# Patient Record
Sex: FEMALE | Race: WHITE | NOT HISPANIC OR LATINO | Employment: OTHER | URBAN - METROPOLITAN AREA
[De-identification: names, ages, dates, MRNs, and addresses within clinical notes are randomized per-mention and may not be internally consistent; named-entity substitution may affect disease eponyms.]

---

## 2017-07-24 ENCOUNTER — HISTORICAL (OUTPATIENT)
Dept: RADIOLOGY | Facility: HOSPITAL | Age: 57
End: 2017-07-24

## 2018-04-05 ENCOUNTER — HISTORICAL (OUTPATIENT)
Dept: RADIOLOGY | Facility: HOSPITAL | Age: 58
End: 2018-04-05

## 2021-10-19 ENCOUNTER — HISTORICAL (OUTPATIENT)
Dept: RESPIRATORY THERAPY | Facility: HOSPITAL | Age: 61
End: 2021-10-19

## 2022-06-21 ENCOUNTER — OFFICE VISIT (OUTPATIENT)
Dept: URGENT CARE | Facility: CLINIC | Age: 62
End: 2022-06-21
Payer: MEDICARE

## 2022-06-21 VITALS
BODY MASS INDEX: 55.32 KG/M2 | HEIGHT: 61 IN | WEIGHT: 293 LBS | RESPIRATION RATE: 18 BRPM | TEMPERATURE: 98 F | SYSTOLIC BLOOD PRESSURE: 119 MMHG | DIASTOLIC BLOOD PRESSURE: 66 MMHG | HEART RATE: 78 BPM | OXYGEN SATURATION: 97 %

## 2022-06-21 DIAGNOSIS — R30.0 DYSURIA: Primary | ICD-10-CM

## 2022-06-21 LAB
BILIRUB UR QL STRIP: NEGATIVE
GLUCOSE UR QL STRIP: NEGATIVE
KETONES UR QL STRIP: NEGATIVE
LEUKOCYTE ESTERASE UR QL STRIP: POSITIVE
PH, POC UA: 7
POC BLOOD, URINE: POSITIVE
POC NITRATES, URINE: NEGATIVE
PROT UR QL STRIP: NEGATIVE
SP GR UR STRIP: 1.01 (ref 1–1.03)
UROBILINOGEN UR STRIP-ACNC: 0.2 (ref 0.1–1.1)

## 2022-06-21 PROCEDURE — 99215 OFFICE O/P EST HI 40 MIN: CPT | Mod: PBBFAC | Performed by: NURSE PRACTITIONER

## 2022-06-21 PROCEDURE — 87088 URINE BACTERIA CULTURE: CPT | Performed by: NURSE PRACTITIONER

## 2022-06-21 PROCEDURE — 81003 URINALYSIS AUTO W/O SCOPE: CPT | Mod: PBBFAC | Performed by: NURSE PRACTITIONER

## 2022-06-21 PROCEDURE — 99213 PR OFFICE/OUTPT VISIT, EST, LEVL III, 20-29 MIN: ICD-10-PCS | Mod: S$PBB,,, | Performed by: NURSE PRACTITIONER

## 2022-06-21 PROCEDURE — 99213 OFFICE O/P EST LOW 20 MIN: CPT | Mod: S$PBB,,, | Performed by: NURSE PRACTITIONER

## 2022-06-21 RX ORDER — SIMVASTATIN 40 MG/1
TABLET, FILM COATED ORAL
COMMUNITY
End: 2024-02-01

## 2022-06-21 RX ORDER — METFORMIN HYDROCHLORIDE 500 MG/1
TABLET ORAL
COMMUNITY
Start: 2022-05-31

## 2022-06-21 RX ORDER — METOPROLOL SUCCINATE 25 MG/1
25 TABLET, EXTENDED RELEASE ORAL DAILY
COMMUNITY
Start: 2022-05-05 | End: 2024-02-01

## 2022-06-21 RX ORDER — PAROXETINE 10 MG/1
TABLET, FILM COATED ORAL
COMMUNITY
End: 2022-10-19 | Stop reason: ALTCHOICE

## 2022-06-21 RX ORDER — LOSARTAN POTASSIUM 100 MG/1
100 TABLET ORAL DAILY
COMMUNITY
Start: 2022-05-05 | End: 2024-02-01

## 2022-06-21 RX ORDER — LEVOTHYROXINE SODIUM 50 UG/1
TABLET ORAL
COMMUNITY
End: 2023-07-06

## 2022-06-21 RX ORDER — GABAPENTIN 600 MG/1
TABLET ORAL
COMMUNITY

## 2022-06-21 RX ORDER — ESOMEPRAZOLE MAGNESIUM 40 MG/1
GRANULE, DELAYED RELEASE ORAL
COMMUNITY
End: 2023-07-06

## 2022-06-21 RX ORDER — NITROFURANTOIN 25; 75 MG/1; MG/1
100 CAPSULE ORAL 2 TIMES DAILY
Qty: 14 CAPSULE | Refills: 0 | Status: SHIPPED | OUTPATIENT
Start: 2022-06-21 | End: 2022-06-28

## 2022-06-21 RX ORDER — PHENAZOPYRIDINE HYDROCHLORIDE 100 MG/1
100 TABLET, FILM COATED ORAL
Qty: 9 TABLET | Refills: 0 | Status: SHIPPED | OUTPATIENT
Start: 2022-06-21 | End: 2022-06-24

## 2022-06-21 RX ORDER — PAROXETINE HYDROCHLORIDE 20 MG/1
TABLET, FILM COATED ORAL
COMMUNITY
End: 2022-10-19 | Stop reason: ALTCHOICE

## 2022-06-21 NOTE — PROGRESS NOTES
"Subjective:       Patient ID: Bharati Townsend is a 61 y.o. female.    Vitals:  height is 5' 0.63" (1.54 m) and weight is 138.6 kg (305 lb 9.6 oz) (abnormal). Her oral temperature is 98 °F (36.7 °C). Her blood pressure is 119/66 and her pulse is 78. Her respiration is 18 and oxygen saturation is 97%.     Chief Complaint: Urinary Tract Infection (3 days, dysuria) and Referral    Burning at the end of urination x 3 days. No fevers, n, v, d, flank pain.    ROS    Objective:      Physical Exam   Constitutional: She is oriented to person, place, and time.  Non-toxic appearance. She does not appear ill. No distress. obesity  Pulmonary/Chest: Effort normal.   Abdominal: Normal appearance.   Musculoskeletal:      Comments: Ambulates with walker.   Neurological: She is alert and oriented to person, place, and time. Gait abnormal.   Psychiatric: Her behavior is normal. Mood and thought content normal.         Assessment:       1. Dysuria        Results for orders placed or performed in visit on 06/21/22   POCT Urinalysis, Dipstick, Automated, W/O Scope   Result Value Ref Range    POC Blood, Urine Positive (A) Negative    POC Bilirubin, Urine Negative Negative    POC Urobilinogen, Urine 0.2 0.1 - 1.1    POC Ketones, Urine Negative Negative    POC Protein, Urine Negative Negative    POC Nitrates, Urine Negative Negative    POC Glucose, Urine Negative Negative    pH, UA 7.0     POC Specific Gravity, Urine 1.015 1.003 - 1.029    POC Leukocytes, Urine Positive (A) Negative       Plan:         Dysuria  -     POCT Urinalysis, Dipstick, Automated, W/O Scope  -     Urine culture    Other orders  -     nitrofurantoin, macrocrystal-monohydrate, (MACROBID) 100 MG capsule; Take 1 capsule (100 mg total) by mouth 2 (two) times daily. for 7 days  Dispense: 14 capsule; Refill: 0  -     phenazopyridine (PYRIDIUM) 100 MG tablet; Take 1 tablet (100 mg total) by mouth 3 (three) times daily with meals. Will cause orange to red urine staining. for 3 " days  Dispense: 9 tablet; Refill: 0         Keep forcing hydration with water.  No cranberry juice.  Cranberry concentrate if desired.  Start antibiotics today.  Will notify if needing antibiotic change in 3 days when culture has resulted.

## 2022-06-21 NOTE — PATIENT INSTRUCTIONS
Keep forcing hydration with water.  No cranberry juice.  Cranberry concentrate if desired.  Start antibiotics today.  Will notify if needing antibiotic change in 3 days when culture has resulted.

## 2022-06-22 ENCOUNTER — HOSPITAL ENCOUNTER (EMERGENCY)
Facility: HOSPITAL | Age: 62
Discharge: HOME | End: 2022-06-23
Attending: FAMILY MEDICINE
Payer: MEDICARE

## 2022-06-22 DIAGNOSIS — N20.0 KIDNEY STONE ON RIGHT SIDE: Primary | ICD-10-CM

## 2022-06-22 LAB
ABS NEUT CALC (OHS): 6.73 X10(3)/MCL (ref 2.1–9.2)
ALBUMIN SERPL-MCNC: 3.4 GM/DL (ref 3.4–4.8)
ALBUMIN/GLOB SERPL: 0.9 RATIO (ref 1.1–2)
ALP SERPL-CCNC: 77 UNIT/L (ref 40–150)
ALT SERPL-CCNC: 18 UNIT/L (ref 0–55)
APPEARANCE UR: ABNORMAL
AST SERPL-CCNC: 24 UNIT/L (ref 5–34)
BACTERIA #/AREA URNS AUTO: ABNORMAL /HPF
BILIRUB UR QL STRIP.AUTO: ABNORMAL MG/DL
BILIRUBIN DIRECT+TOT PNL SERPL-MCNC: 0.6 MG/DL
BUN SERPL-MCNC: 12 MG/DL (ref 9.8–20.1)
CALCIUM SERPL-MCNC: 9.7 MG/DL (ref 8.4–10.2)
CHLORIDE SERPL-SCNC: 103 MMOL/L (ref 98–107)
CO2 SERPL-SCNC: 30 MMOL/L (ref 23–31)
COLOR UR AUTO: ABNORMAL
CREAT SERPL-MCNC: 0.78 MG/DL (ref 0.55–1.02)
EOSINOPHIL NFR BLD MANUAL: 0.12 X10(3)/MCL (ref 0–0.9)
EOSINOPHIL NFR BLD MANUAL: 1 % (ref 0–8)
ERYTHROCYTE [DISTWIDTH] IN BLOOD BY AUTOMATED COUNT: 14 % (ref 11.5–17)
GLOBULIN SER-MCNC: 3.8 GM/DL (ref 2.4–3.5)
GLUCOSE SERPL-MCNC: 146 MG/DL (ref 82–115)
GLUCOSE UR QL STRIP.AUTO: NORMAL MG/DL
HCT VFR BLD AUTO: 43.7 % (ref 37–47)
HGB BLD-MCNC: 13.4 GM/DL (ref 12–16)
HYALINE CASTS #/AREA URNS LPF: ABNORMAL /LPF
IMM GRANULOCYTES # BLD AUTO: 0.03 X10(3)/MCL (ref 0–0.02)
IMM GRANULOCYTES NFR BLD AUTO: 0.3 % (ref 0–0.43)
KETONES UR QL STRIP.AUTO: NEGATIVE MG/DL
LEUKOCYTE ESTERASE UR QL STRIP.AUTO: 75 UNIT/L
LYMPH ABN # BLD MANUAL: 1 %
LYMPHOCYTES NFR BLD MANUAL: 38 % (ref 13–40)
LYMPHOCYTES NFR BLD MANUAL: 4.48 X10(3)/MCL
MCH RBC QN AUTO: 28.3 PG (ref 27–31)
MCHC RBC AUTO-ENTMCNC: 30.7 MG/DL (ref 33–36)
MCV RBC AUTO: 92.2 FL (ref 80–94)
MONOCYTES NFR BLD MANUAL: 0.35 X10(3)/MCL (ref 0.1–1.3)
MONOCYTES NFR BLD MANUAL: 3 % (ref 2–11)
MUCOUS THREADS URNS QL MICRO: ABNORMAL /LPF
NEUTROPHILS NFR BLD MANUAL: 57 % (ref 47–80)
NITRITE UR QL STRIP.AUTO: ABNORMAL
NRBC BLD AUTO-RTO: 0 %
PH UR STRIP.AUTO: 5 [PH]
PLATELET # BLD AUTO: 363 X10(3)/MCL (ref 130–400)
PLATELET # BLD EST: ADEQUATE 10*3/UL
PMV BLD AUTO: 9.8 FL (ref 9.4–12.4)
POTASSIUM SERPL-SCNC: 5 MMOL/L (ref 3.5–5.1)
PROT SERPL-MCNC: 7.2 GM/DL (ref 5.8–7.6)
PROT UR QL STRIP.AUTO: ABNORMAL MG/DL
RBC # BLD AUTO: 4.74 X10(6)/MCL (ref 4.2–5.4)
RBC #/AREA URNS AUTO: ABNORMAL /HPF
RBC MORPH BLD: NORMAL
RBC UR QL AUTO: NEGATIVE UNIT/L
SODIUM SERPL-SCNC: 142 MMOL/L (ref 136–145)
SP GR UR STRIP.AUTO: 1.02
SQUAMOUS #/AREA URNS LPF: ABNORMAL /HPF
UROBILINOGEN UR STRIP-ACNC: ABNORMAL MG/DL
WBC # SPEC AUTO: 11.8 X10(3)/MCL (ref 4.5–11.5)
WBC #/AREA URNS AUTO: ABNORMAL /HPF

## 2022-06-22 PROCEDURE — 85025 COMPLETE CBC W/AUTO DIFF WBC: CPT | Performed by: NURSE PRACTITIONER

## 2022-06-22 PROCEDURE — 25000003 PHARM REV CODE 250: Performed by: NURSE PRACTITIONER

## 2022-06-22 PROCEDURE — 63600175 PHARM REV CODE 636 W HCPCS: Performed by: NURSE PRACTITIONER

## 2022-06-22 PROCEDURE — 96375 TX/PRO/DX INJ NEW DRUG ADDON: CPT

## 2022-06-22 PROCEDURE — 99285 EMERGENCY DEPT VISIT HI MDM: CPT | Mod: 25

## 2022-06-22 PROCEDURE — 80053 COMPREHEN METABOLIC PANEL: CPT | Performed by: NURSE PRACTITIONER

## 2022-06-22 PROCEDURE — 96374 THER/PROPH/DIAG INJ IV PUSH: CPT

## 2022-06-22 PROCEDURE — 96361 HYDRATE IV INFUSION ADD-ON: CPT

## 2022-06-22 PROCEDURE — 36415 COLL VENOUS BLD VENIPUNCTURE: CPT | Performed by: NURSE PRACTITIONER

## 2022-06-22 PROCEDURE — 81001 URINALYSIS AUTO W/SCOPE: CPT | Performed by: NURSE PRACTITIONER

## 2022-06-22 RX ORDER — ONDANSETRON 4 MG/1
4 TABLET, ORALLY DISINTEGRATING ORAL
Status: COMPLETED | OUTPATIENT
Start: 2022-06-22 | End: 2022-06-22

## 2022-06-22 RX ORDER — ONDANSETRON 4 MG/1
4 TABLET, ORALLY DISINTEGRATING ORAL EVERY 8 HOURS PRN
Qty: 9 TABLET | Refills: 0 | Status: SHIPPED | OUTPATIENT
Start: 2022-06-22 | End: 2022-06-25

## 2022-06-22 RX ORDER — ONDANSETRON 2 MG/ML
INJECTION INTRAMUSCULAR; INTRAVENOUS
Status: COMPLETED
Start: 2022-06-22 | End: 2022-06-22

## 2022-06-22 RX ORDER — ONDANSETRON 2 MG/ML
4 INJECTION INTRAMUSCULAR; INTRAVENOUS ONCE
Status: COMPLETED | OUTPATIENT
Start: 2022-06-23 | End: 2022-06-22

## 2022-06-22 RX ORDER — KETOROLAC TROMETHAMINE 10 MG/1
10 TABLET, FILM COATED ORAL EVERY 6 HOURS PRN
Qty: 20 TABLET | Refills: 0 | Status: SHIPPED | OUTPATIENT
Start: 2022-06-22 | End: 2022-06-27

## 2022-06-22 RX ORDER — MORPHINE SULFATE 2 MG/ML
2 INJECTION, SOLUTION INTRAMUSCULAR; INTRAVENOUS
Status: COMPLETED | OUTPATIENT
Start: 2022-06-22 | End: 2022-06-22

## 2022-06-22 RX ORDER — HYDROCODONE BITARTRATE AND ACETAMINOPHEN 5; 325 MG/1; MG/1
1 TABLET ORAL
Status: COMPLETED | OUTPATIENT
Start: 2022-06-22 | End: 2022-06-22

## 2022-06-22 RX ORDER — KETOROLAC TROMETHAMINE 30 MG/ML
15 INJECTION, SOLUTION INTRAMUSCULAR; INTRAVENOUS
Status: COMPLETED | OUTPATIENT
Start: 2022-06-22 | End: 2022-06-22

## 2022-06-22 RX ORDER — TAMSULOSIN HYDROCHLORIDE 0.4 MG/1
0.4 CAPSULE ORAL DAILY
Qty: 14 CAPSULE | Refills: 0 | Status: SHIPPED | OUTPATIENT
Start: 2022-06-22 | End: 2022-10-19 | Stop reason: ALTCHOICE

## 2022-06-22 RX ORDER — HYDROCODONE BITARTRATE AND ACETAMINOPHEN 5; 325 MG/1; MG/1
1 TABLET ORAL EVERY 8 HOURS PRN
Qty: 15 TABLET | Refills: 0 | Status: SHIPPED | OUTPATIENT
Start: 2022-06-22 | End: 2022-06-27

## 2022-06-22 RX ADMIN — KETOROLAC TROMETHAMINE 15 MG: 30 INJECTION, SOLUTION INTRAMUSCULAR at 11:06

## 2022-06-22 RX ADMIN — ONDANSETRON 4 MG: 4 TABLET, ORALLY DISINTEGRATING ORAL at 09:06

## 2022-06-22 RX ADMIN — SODIUM CHLORIDE 1000 ML: 9 INJECTION, SOLUTION INTRAVENOUS at 11:06

## 2022-06-22 RX ADMIN — ONDANSETRON 4 MG: 2 INJECTION INTRAMUSCULAR; INTRAVENOUS at 11:06

## 2022-06-22 RX ADMIN — MORPHINE SULFATE 2 MG: 2 INJECTION, SOLUTION INTRAMUSCULAR; INTRAVENOUS at 11:06

## 2022-06-22 RX ADMIN — HYDROCODONE BITARTRATE AND ACETAMINOPHEN 1 TABLET: 5; 325 TABLET ORAL at 09:06

## 2022-06-23 VITALS
WEIGHT: 293 LBS | TEMPERATURE: 98 F | SYSTOLIC BLOOD PRESSURE: 93 MMHG | RESPIRATION RATE: 20 BRPM | OXYGEN SATURATION: 98 % | BODY MASS INDEX: 57.52 KG/M2 | HEIGHT: 60 IN | DIASTOLIC BLOOD PRESSURE: 62 MMHG | HEART RATE: 89 BPM

## 2022-06-23 LAB — BACTERIA UR CULT: ABNORMAL

## 2022-06-23 PROCEDURE — 63600175 PHARM REV CODE 636 W HCPCS: Performed by: NURSE PRACTITIONER

## 2022-06-23 NOTE — DISCHARGE INSTRUCTIONS
Follow up with your primary care physician in 3-5 days for follow up evaluation.  Take pain medication as prescribed for no more than 7 days.  Increase fluid intake, clear liquids x 12 hours. Advance diet slowly.

## 2022-06-23 NOTE — ED PROVIDER NOTES
Encounter Date: 6/22/2022       History     Chief Complaint   Patient presents with    Flank Pain    Back Pain     Reports right flank/back pain for the last 3 days.  Pt currently being treated for bladder infection     Pt is a 61 y.o. female who presents with Rt lower back pain that radiates into her Rt groin. Symptoms present x 3 days. Denies chest pain, SOB, weakness, dizziness, fever, abdominal pain, or loss of bowel or bladder control. Reports being diagnosed with a bladder infection yesterday at a local clinic and began her medication today.         Review of patient's allergies indicates:   Allergen Reactions    Sulfamethoxazole-trimethoprim Rash     Past Medical History:   Diagnosis Date    Breast cancer     Diabetes mellitus     GERD (gastroesophageal reflux disease)     Hypertension      History reviewed. No pertinent surgical history.  History reviewed. No pertinent family history.  Social History     Tobacco Use    Smoking status: Former Smoker    Smokeless tobacco: Never Used   Substance Use Topics    Alcohol use: Yes     Alcohol/week: 2.0 standard drinks     Types: 2 Standard drinks or equivalent per week    Drug use: Never     Review of Systems   Constitutional: Negative for chills, diaphoresis, fatigue and fever.   HENT: Negative for facial swelling, rhinorrhea, sinus pressure, sinus pain, sore throat and trouble swallowing.    Respiratory: Negative for cough, chest tightness, shortness of breath and wheezing.    Cardiovascular: Negative for chest pain, palpitations and leg swelling.   Gastrointestinal: Negative for abdominal pain, diarrhea, nausea and vomiting.   Genitourinary: Positive for dysuria. Negative for flank pain, frequency, hematuria and urgency.   Musculoskeletal: Positive for back pain. Negative for arthralgias, joint swelling and myalgias.   Skin: Negative for color change and rash.   Neurological: Negative for dizziness, syncope, weakness and light-headedness.    Hematological: Does not bruise/bleed easily.   All other systems reviewed and are negative.      Physical Exam     Initial Vitals [06/22/22 2055]   BP Pulse Resp Temp SpO2   117/77 90 18 98.3 °F (36.8 °C) 98 %      MAP       --         Physical Exam    Nursing note and vitals reviewed.  Constitutional: She appears well-developed and well-nourished.   HENT:   Head: Normocephalic and atraumatic.   Nose: Nose normal.   Mouth/Throat: Oropharynx is clear and moist.   Eyes: Conjunctivae and EOM are normal. Pupils are equal, round, and reactive to light.   Neck: Neck supple.   Normal range of motion.  Cardiovascular: Normal rate, regular rhythm, normal heart sounds and intact distal pulses.   Pulmonary/Chest: Effort normal and breath sounds normal. No respiratory distress. She has no wheezes. She has no rhonchi. She has no rales. She exhibits no tenderness.   Abdominal: Abdomen is soft and flat. Bowel sounds are normal. She exhibits no distension. There is no abdominal tenderness.   There is right CVA tenderness.There is no rebound, no guarding, no tenderness at McBurney's point and negative Rae's sign. negative psoas sign  Musculoskeletal:         General: Normal range of motion.      Cervical back: Normal range of motion and neck supple.      Lumbar back: Spasms and tenderness present. No swelling or deformity. Normal range of motion.        Back:      Neurological: She is alert and oriented to person, place, and time. She has normal strength and normal reflexes.   Skin: Skin is warm and dry. Capillary refill takes less than 2 seconds.   Psychiatric: She has a normal mood and affect. Her speech is normal and behavior is normal. Judgment and thought content normal.         ED Course   Procedures  Labs Reviewed   COMPREHENSIVE METABOLIC PANEL - Abnormal; Notable for the following components:       Result Value    Glucose Level 146 (*)     Globulin 3.8 (*)     Albumin/Globulin Ratio 0.9 (*)     All other components  within normal limits   CBC WITH DIFFERENTIAL - Abnormal; Notable for the following components:    WBC 11.8 (*)     MCHC 30.7 (*)     IG# 0.03 (*)     All other components within normal limits   CBC W/ AUTO DIFFERENTIAL    Narrative:     The following orders were created for panel order CBC auto differential.  Procedure                               Abnormality         Status                     ---------                               -----------         ------                     CBC with Differential[[id number]]        Abnormal            Final result               Manual Differential[[id number]]                              Final result                 Please view results for these tests on the individual orders.   MANUAL DIFFERENTIAL   URINALYSIS, REFLEX TO URINE CULTURE          Imaging Results          CT Abdomen Pelvis  Without Contrast (Preliminary result)  Result time 06/22/22 22:23:09    Preliminary result by Rickey Danw MD (06/22/22 22:23:09)                 Narrative:    START OF REPORT:  Technique: CT of the abdomen and pelvis was performed with axial images as well as sagittal and coronal reconstruction images without intravenous contrast.    Comparison: None available.    Clinical History: Flank pain, kidney stone suspected.    Dosage Information: Automated Exposure Control was utilized.    Findings:  Lines and Tubes: None.  Thorax:  Lungs: The visualized lung bases appear unremarkable.  Pleura: No effusions or thickening.  Heart: The heart size is within normal limits.  Abdomen:  Abdominal Wall: No abdominal wall pathology is seen.  Liver: Mild fatty infiltration of the liver is present.  Biliary System: No extrahepatic biliary duct dilatation is seen.  Gallbladder: The gallbladder appears unremarkable.  Pancreas: The pancreas appears unremarkable.  Spleen: The spleen appears unremarkable.  Adrenals: The adrenal glands appear unremarkable.  Kidneys: There are two 3-4 mm stones at the right  ureterovesical junction (series 2, image 81) with associated mild right hydroureteronephrosis. There are also a few tiny nonobstructing stones in the right kidney. A punctate (1-2 mm) nonobstructing stone is seen at the lower pole calyx of the left kidney (series 6, image 72). The left kidney is otherwise unremarkable.  Aorta: The abdominal aorta appears unremarkable.  IVC: Unremarkable.  Bowel:  Esophagus: The visualized esophagus appears unremarkable.  Stomach: The stomach appears unremarkable.  Duodenum: Unremarkable appearing duodenum.  Small Bowel: The small bowel appears unremarkable.  Colon: Nondistended.  Appendix: The appendix appears unremarkable.  Peritoneum: No intraperitoneal free air or ascites is seen.    Pelvis:  Bladder: The bladder is nondistended and cannot be definitively evaluated.  Female:  Uterus: The uterus is surgically absent.  Ovaries: No adnexal masses are seen.    Bony structures:  Dorsal Spine: There is mild spondylosis of the visualized dorsal spine.      Impression:  1. There are two 3-4 mm stones at the right ureterovesical junction (series 2, image 81) with associated mild right hydroureteronephrosis. Correlate with clinical and laboratory findings as regards additional evaluation and follow-up.                                   Medications   sodium chloride 0.9% bolus 1,000 mL (has no administration in time range)   ketorolac injection 15 mg (has no administration in time range)   ondansetron injection 4 mg (has no administration in time range)   morphine injection 2 mg (has no administration in time range)   HYDROcodone-acetaminophen 5-325 mg per tablet 1 tablet (1 tablet Oral Given 6/22/22 2159)   ondansetron disintegrating tablet 4 mg (4 mg Oral Given 6/22/22 2159)     Medical Decision Making:   Differential Diagnosis:   UTI  Muscle strain  Renal stone  Clinical Tests:   Lab Tests: Ordered and Reviewed  Radiological Study: Ordered and Reviewed  ED Management:  11:26 PM  Reassessed patient at this time. Reports condition has improved. Discussed with patient all pertinent ED information and results. Discussed diagnosis and treatment plan with patient. Follow up instructions and return to ED instruction have been given. All questions and concerns were addressed at this time. Patient voices understanding of information and instructions. Patient is comfortable with plan and discharge. Patient is stable for discharge.                         Clinical Impression:   Final diagnoses:  [N20.0] Kidney stone on right side (Primary)          ED Disposition Condition    Discharge Stable        ED Prescriptions     Medication Sig Dispense Start Date End Date Auth. Provider    HYDROcodone-acetaminophen (NORCO) 5-325 mg per tablet Take 1 tablet by mouth every 8 (eight) hours as needed for Pain. 15 tablet 6/22/2022 6/27/2022 Venkatesh Blake Jr., ELENITA    ketorolac (TORADOL) 10 mg tablet Take 1 tablet (10 mg total) by mouth every 6 (six) hours as needed for Pain. 20 tablet 6/22/2022 6/27/2022 Venkatesh Blake Jr., CHETNAP    tamsulosin (FLOMAX) 0.4 mg Cap Take 1 capsule (0.4 mg total) by mouth once daily. for 14 days 14 capsule 6/22/2022 7/6/2022 Venkatesh Blake Jr., CHETNAP    ondansetron (ZOFRAN-ODT) 4 MG TbDL Take 1 tablet (4 mg total) by mouth every 8 (eight) hours as needed (nausea). 9 tablet 6/22/2022 6/25/2022 Venkatesh Blake Jr., CHETNAP        Follow-up Information     Follow up With Specialties Details Why Contact Info    Cony Bourgeois MD Internal Medicine In 1 week  [address] [facility] [id number] [phone]      [facility] Emergency Medicine In 3 days As needed, If symptoms worsen [address] [address] [phone]    [facility]  Schedule an appointment as soon as possible for a visit in 1 week Follow up with [facility] for evaluation. [address] [address]           Venkatesh Blake  , [facility]  06/22/22 1047

## 2022-07-08 ENCOUNTER — HOSPITAL ENCOUNTER (OUTPATIENT)
Dept: RADIOLOGY | Facility: HOSPITAL | Age: 62
Discharge: HOME | End: 2022-07-08
Attending: NURSE PRACTITIONER
Payer: MEDICAID

## 2022-07-08 ENCOUNTER — OFFICE VISIT (OUTPATIENT)
Dept: UROLOGY | Facility: CLINIC | Age: 62
End: 2022-07-08
Payer: MEDICARE

## 2022-07-08 ENCOUNTER — TELEPHONE (OUTPATIENT)
Dept: UROLOGY | Facility: CLINIC | Age: 62
End: 2022-07-08

## 2022-07-08 VITALS
SYSTOLIC BLOOD PRESSURE: 84 MMHG | HEART RATE: 73 BPM | RESPIRATION RATE: 20 BRPM | DIASTOLIC BLOOD PRESSURE: 49 MMHG | TEMPERATURE: 98 F | WEIGHT: 293 LBS | HEIGHT: 62 IN | OXYGEN SATURATION: 97 % | BODY MASS INDEX: 53.92 KG/M2

## 2022-07-08 DIAGNOSIS — N20.0 KIDNEY STONES: ICD-10-CM

## 2022-07-08 DIAGNOSIS — N20.0 KIDNEY STONE ON RIGHT SIDE: ICD-10-CM

## 2022-07-08 PROCEDURE — 3078F PR MOST RECENT DIASTOLIC BLOOD PRESSURE < 80 MM HG: ICD-10-PCS | Mod: CPTII,,, | Performed by: NURSE PRACTITIONER

## 2022-07-08 PROCEDURE — 1159F PR MEDICATION LIST DOCUMENTED IN MEDICAL RECORD: ICD-10-PCS | Mod: CPTII,,, | Performed by: NURSE PRACTITIONER

## 2022-07-08 PROCEDURE — 1160F RVW MEDS BY RX/DR IN RCRD: CPT | Mod: CPTII,,, | Performed by: NURSE PRACTITIONER

## 2022-07-08 PROCEDURE — 3074F PR MOST RECENT SYSTOLIC BLOOD PRESSURE < 130 MM HG: ICD-10-PCS | Mod: CPTII,,, | Performed by: NURSE PRACTITIONER

## 2022-07-08 PROCEDURE — 99214 OFFICE O/P EST MOD 30 MIN: CPT | Mod: S$PBB,,, | Performed by: NURSE PRACTITIONER

## 2022-07-08 PROCEDURE — 99214 PR OFFICE/OUTPT VISIT, EST, LEVL IV, 30-39 MIN: ICD-10-PCS | Mod: S$PBB,,, | Performed by: NURSE PRACTITIONER

## 2022-07-08 PROCEDURE — 1160F PR REVIEW ALL MEDS BY PRESCRIBER/CLIN PHARMACIST DOCUMENTED: ICD-10-PCS | Mod: CPTII,,, | Performed by: NURSE PRACTITIONER

## 2022-07-08 PROCEDURE — 74018 RADEX ABDOMEN 1 VIEW: CPT | Mod: TC

## 2022-07-08 PROCEDURE — 1159F MED LIST DOCD IN RCRD: CPT | Mod: CPTII,,, | Performed by: NURSE PRACTITIONER

## 2022-07-08 PROCEDURE — 3008F PR BODY MASS INDEX (BMI) DOCUMENTED: ICD-10-PCS | Mod: CPTII,,, | Performed by: NURSE PRACTITIONER

## 2022-07-08 PROCEDURE — 99215 OFFICE O/P EST HI 40 MIN: CPT | Mod: PBBFAC | Performed by: NURSE PRACTITIONER

## 2022-07-08 PROCEDURE — 4010F PR ACE/ARB THEARPY RXD/TAKEN: ICD-10-PCS | Mod: CPTII,,, | Performed by: NURSE PRACTITIONER

## 2022-07-08 PROCEDURE — 3078F DIAST BP <80 MM HG: CPT | Mod: CPTII,,, | Performed by: NURSE PRACTITIONER

## 2022-07-08 PROCEDURE — 3074F SYST BP LT 130 MM HG: CPT | Mod: CPTII,,, | Performed by: NURSE PRACTITIONER

## 2022-07-08 PROCEDURE — 3008F BODY MASS INDEX DOCD: CPT | Mod: CPTII,,, | Performed by: NURSE PRACTITIONER

## 2022-07-08 PROCEDURE — 4010F ACE/ARB THERAPY RXD/TAKEN: CPT | Mod: CPTII,,, | Performed by: NURSE PRACTITIONER

## 2022-07-08 NOTE — TELEPHONE ENCOUNTER
Patient notified of negative KUB no stones identified.  Instructed patient follow-up in 6 months a KUB repeat if negative will follow up p.r.n..  Also informed patient if symptoms recurred notify clinic to be revaluated treated.

## 2022-07-08 NOTE — PROGRESS NOTES
Placed in room. Seen by tulio Guerrero NP.  Spoke with patient. Will obtain KUB. F/U in 6 months.

## 2022-07-08 NOTE — PROGRESS NOTES
Chief Complaint:   Chief Complaint   Patient presents with    Nephrolithiasis     Right sided back pain       HPI:  Patient is a 61-year-old female referred to Urology for right kidney stones.  Patient seen in emergency room on 2022 with right flank pain radiating to her right groin for 3 days.  CT was performed and identified two 3-4 mm stones at the right ureterovesical junction (series 2, image 81) with associated mild right hydroureteronephrosis. There are also a few tiny nonobstructing stones in the right kidney. A punctate (1-2 mm) nonobstructing stone is seen at the lower pole calyx of the left kidney (series 6, image 72). The left kidney is otherwise unremarkable.  Today patient denies Flank pain, groin pain, lower abdominal pain, evidence of passing stone, strainer use, urinary frequency, urgency, incontinence, retention, dysuria, gross hematuria. Family history of stones, or urology pathology.     Plan: stone surveillance, KUB now, give patient a strainer to strain all urine until KUB confirmed, F/U 6 months with KUB. Instructed patient on Avoiding bladder irritants, such as alcohol, citrus drinks or foods,salty foods, energy drinks, spicy foods, caffeine, sodas.    Allergies:  Review of patient's allergies indicates:   Allergen Reactions    Sulfamethoxazole-trimethoprim Rash       Medications:  Current Outpatient Medications   Medication Sig Dispense Refill    esomeprazole (NEXIUM) 40 mg GrPS esomeprazole magnesium DR 40 mg granules delayed release for susp   Take 1 packet every day by oral route.      gabapentin (NEURONTIN) 600 MG tablet gabapentin 600 mg tablet      levothyroxine (SYNTHROID) 50 MCG tablet levothyroxine 50 mcg tablet      losartan (COZAAR) 100 MG tablet Take 100 mg by mouth once daily.      metFORMIN (GLUCOPHAGE) 500 MG tablet SMARTSI Tablet(s) By Mouth Morning-Evening      metoprolol succinate (TOPROL-XL) 25 MG 24 hr tablet Take 25 mg by mouth once daily.       paroxetine (PAXIL) 10 MG tablet paroxetine 10 mg tablet      paroxetine (PAXIL) 20 MG tablet paroxetine 20 mg tablet   1 po qd      simvastatin (ZOCOR) 40 MG tablet simvastatin 40 mg tablet   Take 1 tablet every day by oral route.      tamsulosin (FLOMAX) 0.4 mg Cap Take 1 capsule (0.4 mg total) by mouth once daily. for 14 days 14 capsule 0     No current facility-administered medications for this visit.       Review of Systems:  General: No fever, chills, fatigability, or weight loss.  Skin: No rashes, itching, or changes in color or texture of skin.  Chest: Denies HAYES, cyanosis, wheezing, cough, and sputum production.  Abdomen: Appetite fine. No weight loss. Denies diarrhea, abdominal pain, hematemesis, or blood in stool.  Musculoskeletal: No joint stiffness or swelling. Denies back pain.  : As above.  All other review of systems negative.    PMH:  Past Medical History:   Diagnosis Date    Breast cancer     Diabetes mellitus     GERD (gastroesophageal reflux disease)     Hypertension        PSH:  No past surgical history on file.    FamHx:  No family history on file.    SocHx:  Social History     Socioeconomic History    Marital status:    Tobacco Use    Smoking status: Former Smoker    Smokeless tobacco: Never Used   Substance and Sexual Activity    Alcohol use: Yes     Alcohol/week: 2.0 standard drinks     Types: 2 Standard drinks or equivalent per week    Drug use: Never       Physical Exam:  Vitals:    07/08/22 0748   BP: (!) 84/49   Pulse: 73   Resp: 20   Temp: 98.1 °F (36.7 °C)     General: A&Ox3, no apparent distress, no deformities  Neck: No masses, normal thyroid  Lungs: CTA corrine, no use of accessory muscles  Heart: RRR, no arrhythmias  Abdomen: Soft, NT, ND, no masses, no hernias, no hepatosplenomegaly  Lymphatic: Neck and groin nodes negative  Skin: The skin is warm and dry. No jaundice.  Ext: No c/c/e.      Labs:  None      Imaging:  Pending KUB      Impression:  Kidney  stones      Plan: stone surveillance, KUB, give patient a strainer to strain all urine until KUB confirmed, F/U 6 months. Instructed patient on Avoiding bladder irritants, such as alcohol, citrus drinks or foods,salty foods, energy drinks, spicy foods, caffeine, sodas.

## 2022-07-28 DIAGNOSIS — M79.642 LEFT HAND PAIN: Primary | ICD-10-CM

## 2022-09-08 ENCOUNTER — OFFICE VISIT (OUTPATIENT)
Dept: ORTHOPEDICS | Facility: CLINIC | Age: 62
End: 2022-09-08
Payer: MEDICARE

## 2022-09-08 VITALS
DIASTOLIC BLOOD PRESSURE: 78 MMHG | HEIGHT: 62 IN | WEIGHT: 293 LBS | HEART RATE: 78 BPM | BODY MASS INDEX: 53.92 KG/M2 | SYSTOLIC BLOOD PRESSURE: 119 MMHG | RESPIRATION RATE: 20 BRPM

## 2022-09-08 DIAGNOSIS — M79.642 LEFT HAND PAIN: ICD-10-CM

## 2022-09-08 DIAGNOSIS — M65.332 TRIGGER MIDDLE FINGER OF LEFT HAND: Primary | ICD-10-CM

## 2022-09-08 PROCEDURE — 3074F PR MOST RECENT SYSTOLIC BLOOD PRESSURE < 130 MM HG: ICD-10-PCS | Mod: CPTII,,, | Performed by: STUDENT IN AN ORGANIZED HEALTH CARE EDUCATION/TRAINING PROGRAM

## 2022-09-08 PROCEDURE — 99204 OFFICE O/P NEW MOD 45 MIN: CPT | Mod: 25,S$PBB,, | Performed by: STUDENT IN AN ORGANIZED HEALTH CARE EDUCATION/TRAINING PROGRAM

## 2022-09-08 PROCEDURE — 99214 OFFICE O/P EST MOD 30 MIN: CPT | Mod: PBBFAC,25 | Performed by: STUDENT IN AN ORGANIZED HEALTH CARE EDUCATION/TRAINING PROGRAM

## 2022-09-08 PROCEDURE — 1160F RVW MEDS BY RX/DR IN RCRD: CPT | Mod: CPTII,,, | Performed by: STUDENT IN AN ORGANIZED HEALTH CARE EDUCATION/TRAINING PROGRAM

## 2022-09-08 PROCEDURE — 20550 NJX 1 TENDON SHEATH/LIGAMENT: CPT | Mod: PBBFAC,LT | Performed by: STUDENT IN AN ORGANIZED HEALTH CARE EDUCATION/TRAINING PROGRAM

## 2022-09-08 PROCEDURE — 3078F DIAST BP <80 MM HG: CPT | Mod: CPTII,,, | Performed by: STUDENT IN AN ORGANIZED HEALTH CARE EDUCATION/TRAINING PROGRAM

## 2022-09-08 PROCEDURE — 3008F PR BODY MASS INDEX (BMI) DOCUMENTED: ICD-10-PCS | Mod: CPTII,,, | Performed by: STUDENT IN AN ORGANIZED HEALTH CARE EDUCATION/TRAINING PROGRAM

## 2022-09-08 PROCEDURE — 3074F SYST BP LT 130 MM HG: CPT | Mod: CPTII,,, | Performed by: STUDENT IN AN ORGANIZED HEALTH CARE EDUCATION/TRAINING PROGRAM

## 2022-09-08 PROCEDURE — 1159F PR MEDICATION LIST DOCUMENTED IN MEDICAL RECORD: ICD-10-PCS | Mod: CPTII,,, | Performed by: STUDENT IN AN ORGANIZED HEALTH CARE EDUCATION/TRAINING PROGRAM

## 2022-09-08 PROCEDURE — 99204 PR OFFICE/OUTPT VISIT, NEW, LEVL IV, 45-59 MIN: ICD-10-PCS | Mod: 25,S$PBB,, | Performed by: STUDENT IN AN ORGANIZED HEALTH CARE EDUCATION/TRAINING PROGRAM

## 2022-09-08 PROCEDURE — 3078F PR MOST RECENT DIASTOLIC BLOOD PRESSURE < 80 MM HG: ICD-10-PCS | Mod: CPTII,,, | Performed by: STUDENT IN AN ORGANIZED HEALTH CARE EDUCATION/TRAINING PROGRAM

## 2022-09-08 PROCEDURE — 4010F PR ACE/ARB THEARPY RXD/TAKEN: ICD-10-PCS | Mod: CPTII,,, | Performed by: STUDENT IN AN ORGANIZED HEALTH CARE EDUCATION/TRAINING PROGRAM

## 2022-09-08 PROCEDURE — 1159F MED LIST DOCD IN RCRD: CPT | Mod: CPTII,,, | Performed by: STUDENT IN AN ORGANIZED HEALTH CARE EDUCATION/TRAINING PROGRAM

## 2022-09-08 PROCEDURE — 1160F PR REVIEW ALL MEDS BY PRESCRIBER/CLIN PHARMACIST DOCUMENTED: ICD-10-PCS | Mod: CPTII,,, | Performed by: STUDENT IN AN ORGANIZED HEALTH CARE EDUCATION/TRAINING PROGRAM

## 2022-09-08 PROCEDURE — 20550 TENDON SHEATH: ICD-10-PCS | Mod: S$PBB,LT,, | Performed by: STUDENT IN AN ORGANIZED HEALTH CARE EDUCATION/TRAINING PROGRAM

## 2022-09-08 PROCEDURE — 4010F ACE/ARB THERAPY RXD/TAKEN: CPT | Mod: CPTII,,, | Performed by: STUDENT IN AN ORGANIZED HEALTH CARE EDUCATION/TRAINING PROGRAM

## 2022-09-08 PROCEDURE — 3008F BODY MASS INDEX DOCD: CPT | Mod: CPTII,,, | Performed by: STUDENT IN AN ORGANIZED HEALTH CARE EDUCATION/TRAINING PROGRAM

## 2022-09-08 RX ORDER — LIDOCAINE HYDROCHLORIDE 10 MG/ML
1 INJECTION INFILTRATION; PERINEURAL
Status: DISCONTINUED | OUTPATIENT
Start: 2022-09-08 | End: 2022-09-08 | Stop reason: HOSPADM

## 2022-09-08 RX ORDER — TRIAMCINOLONE ACETONIDE 40 MG/ML
40 INJECTION, SUSPENSION INTRA-ARTICULAR; INTRAMUSCULAR ONCE
Status: COMPLETED | OUTPATIENT
Start: 2022-09-08 | End: 2022-09-08

## 2022-09-08 RX ORDER — TRIAMCINOLONE ACETONIDE 40 MG/ML
40 INJECTION, SUSPENSION INTRA-ARTICULAR; INTRAMUSCULAR
Status: DISCONTINUED | OUTPATIENT
Start: 2022-09-08 | End: 2022-09-08 | Stop reason: HOSPADM

## 2022-09-08 RX ORDER — PAROXETINE 30 MG/1
60 TABLET, FILM COATED ORAL DAILY
COMMUNITY
Start: 2022-07-21 | End: 2024-02-01

## 2022-09-08 RX ORDER — LIDOCAINE HYDROCHLORIDE 10 MG/ML
1 INJECTION, SOLUTION EPIDURAL; INFILTRATION; INTRACAUDAL; PERINEURAL
Status: COMPLETED | OUTPATIENT
Start: 2022-09-08 | End: 2022-09-08

## 2022-09-08 RX ADMIN — TRIAMCINOLONE ACETONIDE 40 MG: 40 INJECTION, SUSPENSION INTRA-ARTICULAR; INTRAMUSCULAR at 09:09

## 2022-09-08 RX ADMIN — LIDOCAINE HYDROCHLORIDE 1 ML: 10 INJECTION INFILTRATION; PERINEURAL at 09:09

## 2022-09-08 RX ADMIN — LIDOCAINE HYDROCHLORIDE 10 MG: 10 INJECTION, SOLUTION EPIDURAL; INFILTRATION; INTRACAUDAL; PERINEURAL at 09:09

## 2022-09-08 NOTE — PROGRESS NOTES
Subjective:       New pt   Patient ID: Bharati Townsend is a Right dominate 61 y.o. female. Non-smoker. 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          Chief Complaint: Pain of the Left Hand    HPI:    Left hand/wrist pain. Pt states she stated with left  3rd finger trigger finger.  She has catching and locking in the 3rd finger with flexion and also has pain that extends across her metacarpal row.  She occasionally has burning in the fingertips.  Of note, Pt had bilateral CTS release in about 2019 in [facility] with Dr. Muhammad but no longer sees bc of new insurance.  Injury: no known injury  Onset: several years ago fluctuates   Pain level:6 /10  Modifying Factors: worse with activity and increased pain at PM  Associated Symptoms: swelling with increased activity and difficulty sleeping s/t pain  Activity: pain moderately interferes with ADLs .   Previous Treatments: RX gabapentin without significant relief.  PMH: + DM , controlled      Note:       Current Outpatient Medications:     esomeprazole (NEXIUM) 40 mg GrPS, esomeprazole magnesium DR 40 mg granules delayed release for susp  Take 1 packet every day by oral route., Disp: , Rfl:     gabapentin (NEURONTIN) 600 MG tablet, gabapentin 600 mg tablet, Disp: , Rfl:     levothyroxine (SYNTHROID) 50 MCG tablet, levothyroxine 50 mcg tablet, Disp: , Rfl:     losartan (COZAAR) 100 MG tablet, Take 100 mg by mouth once daily., Disp: , Rfl:     metFORMIN (GLUCOPHAGE) 500 MG tablet, SMARTSI Tablet(s) By Mouth Morning-Evening, Disp: , Rfl:     metoprolol succinate (TOPROL-XL) 25 MG 24 hr tablet, Take 25 mg by mouth once daily., Disp: , Rfl:     paroxetine (PAXIL) 30 MG tablet, Take 60 mg by mouth once daily., Disp: , Rfl:     simvastatin (ZOCOR) 40 MG tablet, simvastatin 40 mg tablet  Take 1 tablet every day by oral route., Disp: , Rfl:     paroxetine (PAXIL) 10 MG tablet, paroxetine 10 mg tablet, Disp: , Rfl:     paroxetine (PAXIL) 20 MG tablet, paroxetine 20 mg  "tablet  1 po qd, Disp: , Rfl:     tamsulosin (FLOMAX) 0.4 mg Cap, Take 1 capsule (0.4 mg total) by mouth once daily. for 14 days, Disp: 14 capsule, Rfl: 0  No current facility-administered medications for this visit.    Review of patient's allergies indicates:   Allergen Reactions    Sulfamethoxazole-trimethoprim Rash       No results found for: LABA1C  Body mass index is 54.58 kg/m².   Vitals:    09/08/22 0917   BP: 119/78   Pulse: 78   Resp: 20   Weight: 135.4 kg (298 lb 6.4 oz)   Height: 5' 2" (1.575 m)   PainSc:   6        ROS  Review of Systems:  A ten-point review of systems was performed and is negative, except as mentioned above.        Objective:        General: well developed; well nourished; cooperative  PSYCH: alert and oriented with appropriate mood and affect  SKIN: inspection and palpation of skin and soft tissue normal;  CV: vascular integrity noted; +2 symmetrical pulses  Resp: Normal work of breathing, quiet breathing    MSK:   left hand     Inspection: absent skin changes; present swelling over 3rd finger A1 pulley; absent atrophy; absent deformity noted.   Palpation: mass absent;TTP present over 3rd finger A1 pulley; crepitus absent;     ROM:   Full range of motion with the exception decreased flexion at the MCP and PIP of the 3rd finger, locking and catching observed  Neurovascular:   Sensation: two point discrimination intact  Motor:   Radial nerve: IP joint extension against resistance intact  Median nerve:   recurrent motor branch: Palmar abduction of thumb intact  anterior interosseous branch: Ok sign intact  Ulnar nerve: abduction of fingers against resistance intact  Vascular: pulses 2+, Allens test intact, capillary refill 2 seconds     Special tests:   Basilar thumb arthritis: Grind test: Negative   De Quervain's tenosynovitis: Finkelstein's test Negative , Eichhoff test Negative   Dupuytren's: table top test Negative   Stability: Becerra's test Negative , Lunotriquetral ballottement test " "Negative , Fovea sign Negative , TFCC compression test Negative   UCL ligament test Negative   Nerve tests: Tinel wrist Negative , Tinel elbow Negative , Phalen's Negative , Froment's sign Negative , Wartenberg Negative ,   Hoffmans" Negative             Assessment:           1. Trigger middle finger of left hand    2. Left hand pain          Plan:       Orders Placed This Encounter    Tendon Sheath    LIDOcaine (PF) 10 mg/ml (1%) injection 10 mg    triamcinolone acetonide injection 40 mg     61 y.o.  patient presenting for evaluation of Pain of the Left Hand   secondary to 3rd middle trigger finger.     moderately exacerbated.   Activity as tolerated, behavior modification encouraged, aluminum finger splint right patient today for use over the next week  Encouraged HEP daily, Ice/Heat prn, NSAIDs/Tylenol/topical anasthetics PRN, med precautions given,   Follow up 6 weeks telemed        Tendon Sheath    Date/Time: 9/8/2022 9:30 AM  Performed by: Savannah Cruz MD  Authorized by: Savannah Cruz MD     Consent Done?:  Yes (Written)  Indications:  Pain  Site marked: the procedure site was marked    Timeout: prior to procedure the correct patient, procedure, and site was verified    Prep: patient was prepped and draped in usual sterile fashion      Local anesthesia used?: No    Anesthesia:  Local infiltration  Local anesthetic:  Topical anesthetic  Location:  Long finger  Site:  L long flexor tendon sheath  Ultrasonic guidance for needle placement?: No    Needle size:  25 G  Approach:  Volar  Medications:  40 mg triamcinolone acetonide 40 mg/mL; 40 mg (KENALOG-40) injection 40 mg/mL; 1 mL LIDOcaine HCL 10 mg/ml (1%) 10 mg/mL (1 %)  Patient tolerance:  Patient tolerated the procedure well with no immediate complications    Additional Comments:  Sterile needle 25G 1 inch was used. Topical ethyl chloride was applied. Contents of syringe included: 1cc of 1% lidocaine with 40mg of Kenalog   Complications: None   EBL: " None   Post Procedure: Patient alert, and moving all extremities. ROM improved, pain decreased.  Good peripheral pulses, no signs of vascular compromise and range of motion intact.  Aftercare instructions were given to patient at time of discharge.  Relative rest for 3 days-avoiding excess activity.  Place ice on the area for 15 minutes every 4-6 hours. Patient may take Tylenol a 1000 mg b.i.d. or ibuprofen 600 mg t.i.d. for the next 3-4 days if not on medication already and safe to take pending co-morbidities.  Protect the area for the next 1-8 hours if anesthetic was used.  Avoid excessive activity for the next 3-4 weeks.  ER precautions given for fever, severe joint pain or allergic reaction or other new symptoms related to the joint injection.        Savannah Cruz MD

## 2022-10-19 ENCOUNTER — OFFICE VISIT (OUTPATIENT)
Dept: ORTHOPEDICS | Facility: CLINIC | Age: 62
End: 2022-10-19
Payer: MEDICAID

## 2022-10-19 DIAGNOSIS — M65.332 TRIGGER MIDDLE FINGER OF LEFT HAND: ICD-10-CM

## 2022-10-19 DIAGNOSIS — M79.642 LEFT HAND PAIN: Primary | ICD-10-CM

## 2022-10-19 PROCEDURE — 1160F RVW MEDS BY RX/DR IN RCRD: CPT | Mod: CPTII,95,, | Performed by: STUDENT IN AN ORGANIZED HEALTH CARE EDUCATION/TRAINING PROGRAM

## 2022-10-19 PROCEDURE — 4010F PR ACE/ARB THEARPY RXD/TAKEN: ICD-10-PCS | Mod: CPTII,95,, | Performed by: STUDENT IN AN ORGANIZED HEALTH CARE EDUCATION/TRAINING PROGRAM

## 2022-10-19 PROCEDURE — 4010F ACE/ARB THERAPY RXD/TAKEN: CPT | Mod: CPTII,95,, | Performed by: STUDENT IN AN ORGANIZED HEALTH CARE EDUCATION/TRAINING PROGRAM

## 2022-10-19 PROCEDURE — 1160F PR REVIEW ALL MEDS BY PRESCRIBER/CLIN PHARMACIST DOCUMENTED: ICD-10-PCS | Mod: CPTII,95,, | Performed by: STUDENT IN AN ORGANIZED HEALTH CARE EDUCATION/TRAINING PROGRAM

## 2022-10-19 PROCEDURE — 99214 OFFICE O/P EST MOD 30 MIN: CPT | Mod: 95,,, | Performed by: STUDENT IN AN ORGANIZED HEALTH CARE EDUCATION/TRAINING PROGRAM

## 2022-10-19 PROCEDURE — 1159F PR MEDICATION LIST DOCUMENTED IN MEDICAL RECORD: ICD-10-PCS | Mod: CPTII,95,, | Performed by: STUDENT IN AN ORGANIZED HEALTH CARE EDUCATION/TRAINING PROGRAM

## 2022-10-19 PROCEDURE — 99214 PR OFFICE/OUTPT VISIT, EST, LEVL IV, 30-39 MIN: ICD-10-PCS | Mod: 95,,, | Performed by: STUDENT IN AN ORGANIZED HEALTH CARE EDUCATION/TRAINING PROGRAM

## 2022-10-19 PROCEDURE — 1159F MED LIST DOCD IN RCRD: CPT | Mod: CPTII,95,, | Performed by: STUDENT IN AN ORGANIZED HEALTH CARE EDUCATION/TRAINING PROGRAM

## 2022-10-19 RX ORDER — AMOXICILLIN 500 MG
CAPSULE ORAL DAILY
COMMUNITY
End: 2024-02-01

## 2022-10-19 RX ORDER — LEVOTHYROXINE SODIUM 175 UG/1
175 TABLET ORAL DAILY
COMMUNITY
Start: 2022-08-01 | End: 2024-02-01

## 2022-10-19 RX ORDER — CHOLECALCIFEROL (VITAMIN D3) 25 MCG
1000 TABLET ORAL
COMMUNITY

## 2022-10-19 NOTE — PROGRESS NOTES
This is a telemedicine encounter note.   Bharati Townsend was evaluated and treated using telemedicine, real time audio and video attempted but video unsuccessful, according to [facility] protocols.   I, Savannah Cruz MD, conducted the visit from [facility] or an [facility] connected phone line. The patient participated in the visit at a non-OU location selected by the patient (or patients representative), identified as there personal residence in [place], [place]. I am currently licensed in the [facility] where the patient stated they are located. The patient (or patients representative) stated that they understood and accepted the privacy and security risks to their information at their location. The platform used for this telemedicine encounter was through our [facility] raman.      Subjective:       Existing pt, last seen 09/08/2022, prior note reviewed, patient was given CSI to left 3rd trigger finger at her last visit.    Patient ID: Bharati Townsend is a Right dominate 62 y.o. female. Non-smoker. 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           Chief Complaint: No chief complaint on file.      HPI:    Left hand/wrist pain. Pt states she stated with left 3rd finger trigger finger.  She has catching and locking in the 3rd finger with flexion and also has pain that extends across her metacarpal row.  She occasionally has burning in the fingertips.  Of note, Pt had bilateral CTS release in about 2019 in [facility] with Dr. Muhammad but no longer sees bc of new insurance.    Today the patient states that after the trigger finger CSI at her last visit she has no longer pain in her hand but the triggering is still persistent.  SHe has been using nighttime finger splinting, provides mild relief.   Injury: no known injury  Onset: several years ago fluctuates   Pain level:0 /10  Modifying Factors: worse with activity and increased pain at PM  Associated Symptoms: swelling with increased  activity and difficulty sleeping s/t pain  Activity: pain moderately interferes with ADLs .   Previous Treatments: RX gabapentin without significant relief.  PMH: + DM , controlled    Note:       Current Outpatient Medications:     blood sugar diagnostic (FREESTYLE LITE STRIPS MISC), FreeStyle Lite Strips  USE TO CHECK BLOOD SUGAR LEVEL EVERY MORNING, Disp: , Rfl:     esomeprazole (NEXIUM) 40 mg GrPS, esomeprazole magnesium DR 40 mg granules delayed release for susp  Take 1 packet every day by oral route., Disp: , Rfl:     gabapentin (NEURONTIN) 600 MG tablet, gabapentin 600 mg tablet, Disp: , Rfl:     lancets 25 gauge Misc, lancets  to check cbg, Disp: , Rfl:     levothyroxine (SYNTHROID) 50 MCG tablet, levothyroxine 50 mcg tablet, Disp: , Rfl:     levothyroxine (SYNTHROID, LEVOTHROID) 175 MCG tablet, Take 175 mcg by mouth once daily., Disp: , Rfl:     losartan (COZAAR) 100 MG tablet, Take 100 mg by mouth once daily., Disp: , Rfl:     metFORMIN (GLUCOPHAGE) 500 MG tablet, SMARTSI Tablet(s) By Mouth Morning-Evening, Disp: , Rfl:     metoprolol succinate (TOPROL-XL) 25 MG 24 hr tablet, Take 25 mg by mouth once daily., Disp: , Rfl:     omega-3 fatty acids/fish oil (FISH OIL-OMEGA-3 FATTY ACIDS) 300-1,000 mg capsule, Take by mouth once daily., Disp: , Rfl:     paroxetine (PAXIL) 30 MG tablet, Take 60 mg by mouth once daily., Disp: , Rfl:     simvastatin (ZOCOR) 40 MG tablet, simvastatin 40 mg tablet  Take 1 tablet every day by oral route., Disp: , Rfl:     vitamin D (VITAMIN D3) 1000 units Tab, Take 1,000 Units by mouth., Disp: , Rfl:     Review of patient's allergies indicates:   Allergen Reactions    Sulfamethoxazole-trimethoprim Rash       No results found for: LABA1C   There is no height or weight on file to calculate BMI.   There were no vitals filed for this visit.     ROS  Review of Systems:  A ten-point review of systems was performed and is negative, except as mentioned above.        Objective:       General: pt cooperative with conversation  PSYCH: alert and oriented with appropriate mood  Resp: quiet breathing, no perceived SOB  MSK:  Limited by TeleMed the patient reports catching left 3rd finger in extension          Assessment:             1. Left hand pain    2. Trigger middle finger of left hand            Plan:          62 y.o.  patient presenting for evaluation of Pain of the Left Hand   secondary to 3rd middle trigger finger.  Patient has steroid injection to the 3rd left trigger finger 6 weeks ago.   Today she is moderately exacerbated.  Pain is improved however mechanical symptoms persist.    Activity as tolerated, behavior modification encouraged, aluminum finger splint to use at night p.r.n. for symptoms  Encouraged HEP daily, Ice/Heat prn, NSAIDs/Tylenol/topical anasthetics PRN, med precautions given,   Follow up in 6 weeks via TeleMed.  If her mechanical symptoms persist or pain has worsened we may consider repeat CSI.    real time audio and video attempted but video unsuccessful Of the time spent with patient,over 50% of which was used for education and counseling regarding medical conditions, current medications including risk/benefit and side effects/adverse events, over the counter medications-uses/doses, home self-care and contact precautions, and red flags and indications for immediate medical attention.  The patient is receptive, expresses understanding and is agreeable to plan. All questions answered to patient's satisfaction.    NOTE:  Extended time spent with patient collecting history and educating on DX and treatment plan.  10 minutes spent prior to call reviewing EMR: prior notes, outside provider documentation, labs and imaging.   10 minutes spent on telemedicine call with the patient obtaining history, observing patient's body part and movement, reviewing results and discussing treatment plan and recommendations.  10 minutes spent after call completing electronic health record  documentation.    Total time devoted to this patient: 30 minutes     Savannah Cruz MD

## 2022-11-30 ENCOUNTER — OFFICE VISIT (OUTPATIENT)
Dept: ORTHOPEDICS | Facility: CLINIC | Age: 62
End: 2022-11-30
Payer: MEDICARE

## 2022-11-30 VITALS — HEIGHT: 62 IN | WEIGHT: 293 LBS | BODY MASS INDEX: 53.92 KG/M2

## 2022-11-30 DIAGNOSIS — M65.332 TRIGGER MIDDLE FINGER OF LEFT HAND: ICD-10-CM

## 2022-11-30 DIAGNOSIS — M79.642 LEFT HAND PAIN: Primary | ICD-10-CM

## 2022-11-30 PROCEDURE — 1160F PR REVIEW ALL MEDS BY PRESCRIBER/CLIN PHARMACIST DOCUMENTED: ICD-10-PCS | Mod: CPTII,95,, | Performed by: STUDENT IN AN ORGANIZED HEALTH CARE EDUCATION/TRAINING PROGRAM

## 2022-11-30 PROCEDURE — 99213 PR OFFICE/OUTPT VISIT, EST, LEVL III, 20-29 MIN: ICD-10-PCS | Mod: 95,,, | Performed by: STUDENT IN AN ORGANIZED HEALTH CARE EDUCATION/TRAINING PROGRAM

## 2022-11-30 PROCEDURE — 1160F RVW MEDS BY RX/DR IN RCRD: CPT | Mod: CPTII,95,, | Performed by: STUDENT IN AN ORGANIZED HEALTH CARE EDUCATION/TRAINING PROGRAM

## 2022-11-30 PROCEDURE — 99213 OFFICE O/P EST LOW 20 MIN: CPT | Mod: 95,,, | Performed by: STUDENT IN AN ORGANIZED HEALTH CARE EDUCATION/TRAINING PROGRAM

## 2022-11-30 PROCEDURE — 1159F PR MEDICATION LIST DOCUMENTED IN MEDICAL RECORD: ICD-10-PCS | Mod: CPTII,95,, | Performed by: STUDENT IN AN ORGANIZED HEALTH CARE EDUCATION/TRAINING PROGRAM

## 2022-11-30 PROCEDURE — 1159F MED LIST DOCD IN RCRD: CPT | Mod: CPTII,95,, | Performed by: STUDENT IN AN ORGANIZED HEALTH CARE EDUCATION/TRAINING PROGRAM

## 2022-11-30 PROCEDURE — 4010F ACE/ARB THERAPY RXD/TAKEN: CPT | Mod: CPTII,95,, | Performed by: STUDENT IN AN ORGANIZED HEALTH CARE EDUCATION/TRAINING PROGRAM

## 2022-11-30 PROCEDURE — 4010F PR ACE/ARB THEARPY RXD/TAKEN: ICD-10-PCS | Mod: CPTII,95,, | Performed by: STUDENT IN AN ORGANIZED HEALTH CARE EDUCATION/TRAINING PROGRAM

## 2022-11-30 PROCEDURE — 3008F BODY MASS INDEX DOCD: CPT | Mod: CPTII,95,, | Performed by: STUDENT IN AN ORGANIZED HEALTH CARE EDUCATION/TRAINING PROGRAM

## 2022-11-30 PROCEDURE — 3008F PR BODY MASS INDEX (BMI) DOCUMENTED: ICD-10-PCS | Mod: CPTII,95,, | Performed by: STUDENT IN AN ORGANIZED HEALTH CARE EDUCATION/TRAINING PROGRAM

## 2022-11-30 NOTE — PROGRESS NOTES
This is a telemedicine encounter note.   Bharati Townsend was evaluated and treated using telemedicine, real time audio and video attempted but video unsuccessful, according to [facility] protocols.   I, Savannah Cruz MD, conducted the visit from [facility] or an [facility] connected phone line. The patient participated in the visit at a non-OU location selected by the patient (or patients representative), identified as there personal residence in [place], [place]. I am currently licensed in the [facility] where the patient stated they are located. The patient (or patients representative) stated that they understood and accepted the privacy and security risks to their information at their location. The platform used for this telemedicine encounter was through our [facility].      Subjective:       Existing pt, last seen 10/19/2022.  On  09/08/2022 patient was given CSI to left 3rd trigger finger   Patient ID: Bharati Townsend is a Right dominate 62 y.o. female. Non-smoker. 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           Chief Complaint: Pain of the Left Hand      HPI:    Left hand/wrist pain. Pt states she stated with left 3rd finger trigger finger.  She has catching and locking in the 3rd finger with flexion and also has pain that extends across her metacarpal row.  She occasionally has burning in the fingertips.  Of note, Pt had bilateral CTS release in about 2019 in [facility] with Dr. Muhammad but no longer sees bc of new insurance.    Today the patient states that since the trigger finger CSI in September she has no longer pain in her hand but the triggering is slightly persistent.  SHe has been using nighttime finger splinting, provides mild relief.  She states her pain does not interfere with ADLs any longer.  Injury: no known injury  Onset: several years ago fluctuates   Pain level:0 /10  Modifying Factors: worse with activity and increased pain at PM  Associated Symptoms:  "swelling with increased activity and difficulty sleeping s/t pain  Activity: pain does not interferes with ADLs .   Previous Treatments: RX gabapentin without significant relief.  PMH: + DM , controlled    Note:       Current Outpatient Medications:     blood sugar diagnostic (FREESTYLE LITE STRIPS MISC), FreeStyle Lite Strips  USE TO CHECK BLOOD SUGAR LEVEL EVERY MORNING, Disp: , Rfl:     esomeprazole (NEXIUM) 40 mg GrPS, esomeprazole magnesium DR 40 mg granules delayed release for susp  Take 1 packet every day by oral route., Disp: , Rfl:     gabapentin (NEURONTIN) 600 MG tablet, gabapentin 600 mg tablet, Disp: , Rfl:     lancets 25 gauge Misc, lancets  to check cbg, Disp: , Rfl:     levothyroxine (SYNTHROID) 50 MCG tablet, levothyroxine 50 mcg tablet, Disp: , Rfl:     levothyroxine (SYNTHROID, LEVOTHROID) 175 MCG tablet, Take 175 mcg by mouth once daily., Disp: , Rfl:     losartan (COZAAR) 100 MG tablet, Take 100 mg by mouth once daily., Disp: , Rfl:     metFORMIN (GLUCOPHAGE) 500 MG tablet, SMARTSI Tablet(s) By Mouth Morning-Evening, Disp: , Rfl:     metoprolol succinate (TOPROL-XL) 25 MG 24 hr tablet, Take 25 mg by mouth once daily., Disp: , Rfl:     omega-3 fatty acids/fish oil (FISH OIL-OMEGA-3 FATTY ACIDS) 300-1,000 mg capsule, Take by mouth once daily., Disp: , Rfl:     paroxetine (PAXIL) 30 MG tablet, Take 60 mg by mouth once daily., Disp: , Rfl:     simvastatin (ZOCOR) 40 MG tablet, simvastatin 40 mg tablet  Take 1 tablet every day by oral route., Disp: , Rfl:     vitamin D (VITAMIN D3) 1000 units Tab, Take 1,000 Units by mouth., Disp: , Rfl:     Review of patient's allergies indicates:   Allergen Reactions    Sulfamethoxazole-trimethoprim Rash       No results found for: LABA1C   Body mass index is 54.6 kg/m².   Vitals:    22 0945   Weight: 135.4 kg (298 lb 8.1 oz)   Height: 5' 2" (1.575 m)   PainSc: 0-No pain        ROS  Review of Systems:  A ten-point review of systems was performed and is " negative, except as mentioned above.        Objective:      General: pt cooperative with conversation  PSYCH: alert and oriented with appropriate mood  Resp: quiet breathing, no perceived SOB  MSK:  Limited by TeleMed the patient reports catching left 3rd finger in extension          Assessment:             1. Left hand pain    2. Trigger middle finger of left hand              Plan:          62 y.o.  patient presenting for evaluation of Pain of the Left Hand   secondary to 3rd middle trigger finger.  Patient has steroid injection to the 3rd left trigger finger in September 2022.   Pain is improved however mechanical symptoms mildly persist.    Activity as tolerated, behavior modification encouraged, aluminum finger splint to use at night p.r.n. for symptoms  Encouraged HEP daily, Ice/Heat prn, NSAIDs/Tylenol/topical anasthetics PRN, med precautions given,   Follow up p.r.n.    real time audio and video attempted but video unsuccessful Of the time spent with patient,over 50% of which was used for education and counseling regarding medical conditions, current medications including risk/benefit and side effects/adverse events, over the counter medications-uses/doses, home self-care and contact precautions, and red flags and indications for immediate medical attention.  The patient is receptive, expresses understanding and is agreeable to plan. All questions answered to patient's satisfaction.    NOTE:  Extended time spent with patient collecting history and educating on DX and treatment plan.  10 minutes spent prior to call reviewing EMR: prior notes, outside provider documentation, labs and imaging.   5 minutes spent on telemedicine call with the patient obtaining history, observing patient's body part and movement, reviewing results and discussing treatment plan and recommendations.  10 minutes spent after call completing electronic health record documentation.    Total time devoted to this patient: 25 minutes      Savannah Cruz MD

## 2023-01-06 ENCOUNTER — HOSPITAL ENCOUNTER (OUTPATIENT)
Dept: RADIOLOGY | Facility: HOSPITAL | Age: 63
Discharge: HOME | End: 2023-01-06
Attending: NURSE PRACTITIONER
Payer: MEDICARE

## 2023-01-06 ENCOUNTER — OFFICE VISIT (OUTPATIENT)
Dept: UROLOGY | Facility: CLINIC | Age: 63
End: 2023-01-06
Payer: MEDICARE

## 2023-01-06 VITALS
DIASTOLIC BLOOD PRESSURE: 59 MMHG | BODY MASS INDEX: 53.92 KG/M2 | HEIGHT: 62 IN | HEART RATE: 76 BPM | SYSTOLIC BLOOD PRESSURE: 102 MMHG | WEIGHT: 293 LBS | OXYGEN SATURATION: 98 % | RESPIRATION RATE: 20 BRPM | TEMPERATURE: 98 F

## 2023-01-06 DIAGNOSIS — N20.0 KIDNEY STONE ON RIGHT SIDE: ICD-10-CM

## 2023-01-06 DIAGNOSIS — N20.0 KIDNEY STONE ON RIGHT SIDE: Primary | ICD-10-CM

## 2023-01-06 PROCEDURE — 3008F PR BODY MASS INDEX (BMI) DOCUMENTED: ICD-10-PCS | Mod: CPTII,,, | Performed by: NURSE PRACTITIONER

## 2023-01-06 PROCEDURE — 74018 RADEX ABDOMEN 1 VIEW: CPT | Mod: TC

## 2023-01-06 PROCEDURE — 99213 PR OFFICE/OUTPT VISIT, EST, LEVL III, 20-29 MIN: ICD-10-PCS | Mod: S$PBB,,, | Performed by: NURSE PRACTITIONER

## 2023-01-06 PROCEDURE — 3074F PR MOST RECENT SYSTOLIC BLOOD PRESSURE < 130 MM HG: ICD-10-PCS | Mod: CPTII,,, | Performed by: NURSE PRACTITIONER

## 2023-01-06 PROCEDURE — 1160F PR REVIEW ALL MEDS BY PRESCRIBER/CLIN PHARMACIST DOCUMENTED: ICD-10-PCS | Mod: CPTII,,, | Performed by: NURSE PRACTITIONER

## 2023-01-06 PROCEDURE — 99215 OFFICE O/P EST HI 40 MIN: CPT | Mod: PBBFAC | Performed by: NURSE PRACTITIONER

## 2023-01-06 PROCEDURE — 1160F RVW MEDS BY RX/DR IN RCRD: CPT | Mod: CPTII,,, | Performed by: NURSE PRACTITIONER

## 2023-01-06 PROCEDURE — 3074F SYST BP LT 130 MM HG: CPT | Mod: CPTII,,, | Performed by: NURSE PRACTITIONER

## 2023-01-06 PROCEDURE — 99213 OFFICE O/P EST LOW 20 MIN: CPT | Mod: S$PBB,,, | Performed by: NURSE PRACTITIONER

## 2023-01-06 PROCEDURE — 1159F MED LIST DOCD IN RCRD: CPT | Mod: CPTII,,, | Performed by: NURSE PRACTITIONER

## 2023-01-06 PROCEDURE — 3078F PR MOST RECENT DIASTOLIC BLOOD PRESSURE < 80 MM HG: ICD-10-PCS | Mod: CPTII,,, | Performed by: NURSE PRACTITIONER

## 2023-01-06 PROCEDURE — 3008F BODY MASS INDEX DOCD: CPT | Mod: CPTII,,, | Performed by: NURSE PRACTITIONER

## 2023-01-06 PROCEDURE — 3078F DIAST BP <80 MM HG: CPT | Mod: CPTII,,, | Performed by: NURSE PRACTITIONER

## 2023-01-06 PROCEDURE — 1159F PR MEDICATION LIST DOCUMENTED IN MEDICAL RECORD: ICD-10-PCS | Mod: CPTII,,, | Performed by: NURSE PRACTITIONER

## 2023-01-06 RX ORDER — KETOROLAC TROMETHAMINE 10 MG/1
10 TABLET, FILM COATED ORAL EVERY 6 HOURS
Qty: 24 TABLET | Refills: 0 | Status: SHIPPED | OUTPATIENT
Start: 2023-01-06 | End: 2023-01-11

## 2023-01-06 RX ORDER — TAMSULOSIN HYDROCHLORIDE 0.4 MG/1
0.4 CAPSULE ORAL DAILY
Qty: 90 CAPSULE | Refills: 0 | Status: SHIPPED | OUTPATIENT
Start: 2023-01-06 | End: 2023-03-30

## 2023-01-06 NOTE — PROGRESS NOTES
Chief Complaint:   Chief Complaint   Patient presents with    Nephrolithiasis       HPI:  Patient is a 62-year-old female here for six-month follow-up for kidney stones.  On last visit patient had KUB negative for stones.  Today patient denies flank pain, groin pain, lower abdominal pain, evidence of passing stone, urinary frequency, urgency, incontinence, retention, dysuria, gross hematuria.  However over the holidays patient had 1 episode of right flank pain lasting 2 days.  Patient denies family history of stones, or urology pathology.     Allergies:  Review of patient's allergies indicates:   Allergen Reactions    Sulfamethoxazole-trimethoprim Rash       Medications:  Current Outpatient Medications   Medication Sig Dispense Refill    blood sugar diagnostic (FREESTYLE LITE STRIPS MISC) FreeStyle Lite Strips   USE TO CHECK BLOOD SUGAR LEVEL EVERY MORNING      esomeprazole (NEXIUM) 40 mg GrPS esomeprazole magnesium DR 40 mg granules delayed release for susp   Take 1 packet every day by oral route.      gabapentin (NEURONTIN) 600 MG tablet gabapentin 600 mg tablet      lancets 25 gauge Misc lancets   to check cbg      levothyroxine (SYNTHROID) 50 MCG tablet levothyroxine 50 mcg tablet      levothyroxine (SYNTHROID, LEVOTHROID) 175 MCG tablet Take 175 mcg by mouth once daily.      losartan (COZAAR) 100 MG tablet Take 100 mg by mouth once daily.      metFORMIN (GLUCOPHAGE) 500 MG tablet SMARTSI Tablet(s) By Mouth Morning-Evening      metoprolol succinate (TOPROL-XL) 25 MG 24 hr tablet Take 25 mg by mouth once daily.      omega-3 fatty acids/fish oil (FISH OIL-OMEGA-3 FATTY ACIDS) 300-1,000 mg capsule Take by mouth once daily.      paroxetine (PAXIL) 30 MG tablet Take 60 mg by mouth once daily.      simvastatin (ZOCOR) 40 MG tablet simvastatin 40 mg tablet   Take 1 tablet every day by oral route.      vitamin D (VITAMIN D3) 1000 units Tab Take 1,000 Units by mouth.       No current facility-administered medications  for this visit.       Review of Systems:  General: No fever, chills, fatigability, or weight loss.  Skin: No rashes, itching, or changes in color or texture of skin.  Chest: Denies HAYES, cyanosis, wheezing, cough, and sputum production.  Abdomen: Appetite fine. No weight loss. Denies diarrhea, abdominal pain, hematemesis, or blood in stool.  Musculoskeletal: No joint stiffness or swelling. Denies back pain.  : As above.  All other review of systems negative.    PMH:  Past Medical History:   Diagnosis Date    Breast cancer     Diabetes mellitus     GERD (gastroesophageal reflux disease)     Hypertension        PSH:  Past Surgical History:   Procedure Laterality Date    BREAST LUMPECTOMY      CARPAL TUNNEL RELEASE      HYSTERECTOMY      KNEE SURGERY      TONSILLECTOMY         FamHx:  Family History   Problem Relation Age of Onset    Diabetes Mother     Cancer Mother     Hypertension Mother     Hyperlipidemia Mother     Hyperlipidemia Father     Hypertension Father     Diabetes Father     CAMILO disease Father     Prostate cancer Father     Heart attack Sister        SocHx:  Social History     Socioeconomic History    Marital status:    Tobacco Use    Smoking status: Former     Passive exposure: Past    Smokeless tobacco: Never   Substance and Sexual Activity    Alcohol use: Yes     Alcohol/week: 2.0 standard drinks     Types: 2 Standard drinks or equivalent per week    Drug use: Never    Sexual activity: Yes     Partners: Male       Physical Exam:  Vitals:    01/06/23 0802   BP: (!) 102/59   Pulse: 76   Resp: 20   Temp: 98.1 °F (36.7 °C)     General: A&Ox3, no apparent distress, no deformities  Neck: No masses, normal thyroid  Lungs: CTA corrine, no use of accessory muscles  Heart: RRR, no arrhythmias  Abdomen: Soft, NT, ND, no masses, no hernias, no hepatosplenomegaly  Lymphatic: Neck and groin nodes negative  Skin: The skin is warm and dry. No jaundice.  Ext: No c/c/e.        Imaging:  Pending KUB      Impression:   Kidney stones       Plan: Stone surveillance, KUB now will notify patient of results, CT, F/U 6 months with a KUB. Increase fluid intake, limit salt in diet, limit protein to 30%, intake adequate calcium, increase brand, soy, Beets, nuts. Instructed patient on Avoiding bladder irritants, such as alcohol, citrus drinks or foods,salty foods, energy drinks, spicy foods, caffeine, sodas.

## 2023-01-06 NOTE — PROGRESS NOTES
Placed in room. Seen by Pete Guerrero NP. Spoke with patient. Will obtain KUB now and in 6 months.

## 2023-07-06 ENCOUNTER — OFFICE VISIT (OUTPATIENT)
Dept: ORTHOPEDICS | Facility: CLINIC | Age: 63
End: 2023-07-06
Payer: MEDICARE

## 2023-07-06 ENCOUNTER — HOSPITAL ENCOUNTER (OUTPATIENT)
Dept: RADIOLOGY | Facility: HOSPITAL | Age: 63
Discharge: HOME | End: 2023-07-06
Attending: FAMILY MEDICINE
Payer: MEDICARE

## 2023-07-06 VITALS
SYSTOLIC BLOOD PRESSURE: 126 MMHG | WEIGHT: 293 LBS | DIASTOLIC BLOOD PRESSURE: 84 MMHG | HEIGHT: 62 IN | HEART RATE: 72 BPM | BODY MASS INDEX: 53.92 KG/M2

## 2023-07-06 DIAGNOSIS — M79.642 PAIN IN LEFT HAND: ICD-10-CM

## 2023-07-06 DIAGNOSIS — M65.332 TRIGGER MIDDLE FINGER OF LEFT HAND: Primary | ICD-10-CM

## 2023-07-06 PROCEDURE — 73130 X-RAY EXAM OF HAND: CPT | Mod: TC,LT

## 2023-07-06 PROCEDURE — 99214 OFFICE O/P EST MOD 30 MIN: CPT | Mod: PBBFAC,25

## 2023-07-06 PROCEDURE — 20550 NJX 1 TENDON SHEATH/LIGAMENT: CPT | Mod: PBBFAC,LT | Performed by: STUDENT IN AN ORGANIZED HEALTH CARE EDUCATION/TRAINING PROGRAM

## 2023-07-06 RX ORDER — LIDOCAINE HYDROCHLORIDE 10 MG/ML
1 INJECTION, SOLUTION EPIDURAL; INFILTRATION; INTRACAUDAL; PERINEURAL
Status: DISPENSED | OUTPATIENT
Start: 2023-07-06

## 2023-07-06 RX ORDER — TRIAMCINOLONE ACETONIDE 40 MG/ML
40 INJECTION, SUSPENSION INTRA-ARTICULAR; INTRAMUSCULAR ONCE
Status: DISPENSED | OUTPATIENT
Start: 2023-07-06

## 2023-07-06 RX ORDER — ESOMEPRAZOLE MAGNESIUM 40 MG/1
40 CAPSULE, DELAYED RELEASE ORAL
COMMUNITY
Start: 2023-04-19

## 2023-07-06 NOTE — PROGRESS NOTES
"Subjective:    Patient ID: Bharati Townsend is a right handed 62 y.o. female  who presented to [facility] for follow up..    Chief Complaint: Pain of the Left Hand and Pain of the Right Hand    History of Present Illness:  Bharati Townsend who has a history of L middle finger trigger finger   presented today with L middle finger trigger finger for the past 1 month.  The patient has been previously has been seen in our sports medicine clinic and was diagnosed with a left middle finger trigger finger and had a corticosteroid injection in October 2022 with very good results.  She has not had any symptoms since she had the tendon sheath injection in October.  Pain only returned about 1 month ago and she is asking for repeat CSI.  Patient is also complaining off MCP and PIP joint pains of the left and right hand.  Patient with a strong family history of rheumatologic disease on most of her siblings having rheumatoid arthritis. Occupation includes: retail, now retired.     Hand Review of Systems:  Swelling?  No  Instability?  No  Clicking?  No  Limited ROM? No  Fever/Chills? No  Numbness/Tingling? No  Weakness? No       Objective:      Physical Exam:  /84   Pulse 72   Ht 5' 2" (1.575 m)   Wt (!) 136.5 kg (301 lb)   BMI 55.05 kg/m²     Appearance:  Soft tissue swelling: Left: no Right: no  Effusion: Left:  Negative Right: Negative  Erythema: Left no Right: no  Ecchymosis: Left: no Right: no  Atrophy: Left: no Right: no    Palpation:  Hand/wrist Tenderness: Left: A1 pulley of the left middle finger and all the MCP / PIP joints  Right: all mcp / pip joints    Range of motion:  Flexion (0-80): Left:  80 Right: 80  Extension (0-70): Left:  70 Right: 70  Ulnar deviation (0-30): 30 Right: 30  Radial deviation (0-20): 20 Right: 20  Supination (0-90): Left: 90 Right: 90  Pronation (0-90): Left: 90 Right: 90  Able to make a power fist and claw hand: on Both hand(s)  Distal " palmar crease-finger tip distance: 0 on Both hand(s)    Strength:  Flexion: Left: 5/5 Pain: No Right: 5/5 Pain: No  Extension: Left: 5/5 Pain: No Right: 5/5 Pain: No  Supination: Left: 5/5 Pain: No Right: 5/5 Pain: No  Pronation: Left: 5/5 Pain: No Right: 5/5 Pain: No  Ulnar deviation: Left: 5/5 Pain: No Right: 5/5 Pain: No  Radial deviation: Left: 5/5 Pain: No Right: 5/5 Pain: No    Special Tests:  Durkans Test (Carpal Compression test): Left: Negative  Right: Negative  Tinels:  Left: Negative  Right: Negative    Phalens: Left: Negative  Right: Negative      AIN/PIN/Radial nerve: Intact and symmetric    General appearance: NAD  Peripheral pulses: normal bilaterally   Reflexes: Left: Not performed Right: Not performed   Sensation: normal    Labs:  Last A1c: The patient doesn't have any registry metric data available     Imaging:   Previous images reviewed.  X-rays ordered and performed today: Yes  # of views: 3 Laterality: left  My Interpretation:   no fracture, dislocation, swelling or degenerative changes noted     Assessment:      Encounter Diagnoses   Code Name Primary?    M65.332 Trigger middle finger of left hand Yes    M79.642 Pain in left hand       Plan:   Dx: left. trigger finger long finger    Treatment Plan:   - patient presents to the clinic today with trigger finger of the left middle finger that has been going on for about 1 month.  Patient also complaining of bilateral wrist/MCP/PIP joint pains.  XR of the left hand without significant degenrative changes. Patient with a strong family history of autoimmune diseases (lupus/rheumatoid). Discussed with patient diagnosis and treatment recommendations.   - CSI of the left middle finger trigger finger  - patient has an appointment with her primary care physician next week.  Recommend workup for autoimmune diseases that could explain her bilateral wrist/MCP/PIP joint pains  - Gentle ROM exercises.  - Home physical therapy exercise handouts provided to  patient.   - Over the counter NSAID and/or tylenol provided you do not have contraindications such as but not limited to liver or kidney disease or uncontrolled blood pressure. If you're doctors have told you to to not take them based on your health, do not take them.   Imaging: radiological studies ordered and independently reviewed; discussed with patient; pending radiologist interpretation.   Procedure: Discussed CSI/VSI as treatment options; discussed CSI vs VS injections as treatment options; since conservative measures did not improve symptoms patient consented for CSI today.  Activity: Activity as tolerated  Therapy: No formal therapy  RTC: PRN; call if any issues.         Tendon Sheath    Date/Time: 7/6/2023 8:50 AM  Performed by: Susan Webb MD  Authorized by: Susan Webb MD     Consent Done?:  Yes (Written)  Indications:  Pain and diagnostic evaluation  Site marked: the procedure site was marked    Timeout: prior to procedure the correct patient, procedure, and site was verified    Prep: patient was prepped and draped in usual sterile fashion      Local anesthesia used?: Yes    Local anesthetic:  Topical anesthetic  Location:  Long finger  Site:  L long flexor tendon sheath  Imaging guidance used: yes.    Needle size:  25 G  Patient tolerance:  Patient tolerated the procedure well with no immediate complications    Additional Comments: Staff: Liseth Pimentel MD     Risks:  Possible complications with the injection include bleeding, infection (.01%), tendon rupture, steroid flare, fat pad or soft tissue atrophy, skin depigmentation, allergic reaction to medications and vasovagal response. (steroid flare treatment is rest, ice, NSAIDs and resolves in 24-36 hours.)    Consent:  No absolute contraindications (cellulitis overlying joint, infection, lack of informed consent, allergy to injection medication, AVN protein or egg allergy for sodium hyaluronate, or history of steroid flare) or relative  contraindications (uncontrolled DM2 A1c>10, coagulopathy, INR > 3.5, previous joint replacement or history of AVN).        Description:  The patient was prepped in normal sterile fashion use of chlorhexidine scrub and the appropriate and anatomic landmarks were identified with ultrasound.  Contents of syringe included: 1cc of 1% lidocaine with 40mg of Kenalog     Post Procedure: Patient alert, and moving all extremities. ROM improved, pain decreased.  Good peripheral pulses, no signs of vascular compromise and range of motion intact.  Aftercare instructions were given to patient at time of discharge.  Relative rest for 3 days-avoiding excess activity.  Place ice on the area for 15 minutes every 4-6 hours. Patient may take Tylenol a 1000 mg b.i.d. or ibuprofen 600 mg t.i.d. for the next 3-4 days if not on medication already and safe to take pending co-morbidities.  Protect the area for the next 1-8 hours if anesthetic was used.  Avoid excessive activity for the next 3-4 weeks.  ER precautions given for fever, severe joint pain or allergic reaction or other new symptoms related to the joint injection.         This note is dictated using the M*Modal Fluency Direct word recognition program. There are word recognition mistakes that are occasionally missed on review.    Susan Webb MD  Sports Medicine Fellow

## 2023-07-14 NOTE — PROGRESS NOTES
Faculty Attestation: Bharati Townsend  was seen in [facility] Medicine [facility]. Discussed with Dr. Webb at the time of the visit. History of Present Illness, Physical Exam, and Assessment and Plan reviewed. Treatment plan is reasonable and appropriate. Compliance with treatment recommendations is important.  Radiology images independently reviewed and agree with radiologist interpretation. Procedure note reviewed. Present for entire procedure with the fellow. Patient tolerated procedure well.      Liseth Pimentel MD  Family/Sports Medicine

## 2023-07-14 NOTE — PROGRESS NOTES
Faculty Attestation: Bharati Townsend  was seen in [facility] Medicine Clinic. Discussed with Dr. Webb at the time of the visit. History of Present Illness, Physical Exam, and Assessment and Plan reviewed. Treatment plan is reasonable and appropriate. Compliance with treatment recommendations is important.  Radiology images independently reviewed and agree with radiologist interpretation. Procedure note reviewed. Patient tolerated procedure well.      Liseth Pimentel MD  Family/Sports Medicine

## 2023-08-10 DIAGNOSIS — N20.0 KIDNEY STONES: Primary | ICD-10-CM

## 2023-08-14 ENCOUNTER — OFFICE VISIT (OUTPATIENT)
Dept: UROLOGY | Facility: CLINIC | Age: 63
End: 2023-08-14
Payer: MEDICARE

## 2023-08-14 ENCOUNTER — TELEPHONE (OUTPATIENT)
Dept: UROLOGY | Facility: CLINIC | Age: 63
End: 2023-08-14

## 2023-08-14 ENCOUNTER — HOSPITAL ENCOUNTER (OUTPATIENT)
Dept: RADIOLOGY | Facility: HOSPITAL | Age: 63
Discharge: HOME | End: 2023-08-14
Attending: NURSE PRACTITIONER
Payer: MEDICARE

## 2023-08-14 VITALS
TEMPERATURE: 98 F | OXYGEN SATURATION: 100 % | BODY MASS INDEX: 53.8 KG/M2 | DIASTOLIC BLOOD PRESSURE: 62 MMHG | HEIGHT: 62 IN | SYSTOLIC BLOOD PRESSURE: 99 MMHG | RESPIRATION RATE: 20 BRPM | WEIGHT: 292.38 LBS | HEART RATE: 82 BPM

## 2023-08-14 DIAGNOSIS — R10.9 ABDOMINAL PAIN, UNSPECIFIED ABDOMINAL LOCATION: ICD-10-CM

## 2023-08-14 DIAGNOSIS — N20.0 KIDNEY STONES: ICD-10-CM

## 2023-08-14 DIAGNOSIS — N20.0 KIDNEY STONE ON RIGHT SIDE: Primary | ICD-10-CM

## 2023-08-14 LAB
BILIRUB SERPL-MCNC: NORMAL MG/DL
BLOOD URINE, POC: NORMAL
COLOR, POC UA: YELLOW
GLUCOSE UR QL STRIP: NORMAL
KETONES UR QL STRIP: NORMAL
LEUKOCYTE ESTERASE URINE, POC: NORMAL
NITRITE, POC UA: NORMAL
PH, POC UA: 5.5
PROTEIN, POC: NORMAL
SPECIFIC GRAVITY, POC UA: >=1.03
UROBILINOGEN, POC UA: 0.2

## 2023-08-14 PROCEDURE — 4010F PR ACE/ARB THEARPY RXD/TAKEN: ICD-10-PCS | Mod: CPTII,,, | Performed by: NURSE PRACTITIONER

## 2023-08-14 PROCEDURE — 1159F MED LIST DOCD IN RCRD: CPT | Mod: CPTII,,, | Performed by: NURSE PRACTITIONER

## 2023-08-14 PROCEDURE — 81001 URINALYSIS AUTO W/SCOPE: CPT | Mod: PBBFAC | Performed by: NURSE PRACTITIONER

## 2023-08-14 PROCEDURE — 1160F RVW MEDS BY RX/DR IN RCRD: CPT | Mod: CPTII,,, | Performed by: NURSE PRACTITIONER

## 2023-08-14 PROCEDURE — 3078F DIAST BP <80 MM HG: CPT | Mod: CPTII,,, | Performed by: NURSE PRACTITIONER

## 2023-08-14 PROCEDURE — 1159F PR MEDICATION LIST DOCUMENTED IN MEDICAL RECORD: ICD-10-PCS | Mod: CPTII,,, | Performed by: NURSE PRACTITIONER

## 2023-08-14 PROCEDURE — 4010F ACE/ARB THERAPY RXD/TAKEN: CPT | Mod: CPTII,,, | Performed by: NURSE PRACTITIONER

## 2023-08-14 PROCEDURE — 74018 RADEX ABDOMEN 1 VIEW: CPT | Mod: TC

## 2023-08-14 PROCEDURE — 3008F PR BODY MASS INDEX (BMI) DOCUMENTED: ICD-10-PCS | Mod: CPTII,,, | Performed by: NURSE PRACTITIONER

## 2023-08-14 PROCEDURE — 3074F PR MOST RECENT SYSTOLIC BLOOD PRESSURE < 130 MM HG: ICD-10-PCS | Mod: CPTII,,, | Performed by: NURSE PRACTITIONER

## 2023-08-14 PROCEDURE — 3008F BODY MASS INDEX DOCD: CPT | Mod: CPTII,,, | Performed by: NURSE PRACTITIONER

## 2023-08-14 PROCEDURE — 3078F PR MOST RECENT DIASTOLIC BLOOD PRESSURE < 80 MM HG: ICD-10-PCS | Mod: CPTII,,, | Performed by: NURSE PRACTITIONER

## 2023-08-14 PROCEDURE — 99213 PR OFFICE/OUTPT VISIT, EST, LEVL III, 20-29 MIN: ICD-10-PCS | Mod: S$PBB,,, | Performed by: NURSE PRACTITIONER

## 2023-08-14 PROCEDURE — 99213 OFFICE O/P EST LOW 20 MIN: CPT | Mod: S$PBB,,, | Performed by: NURSE PRACTITIONER

## 2023-08-14 PROCEDURE — 1160F PR REVIEW ALL MEDS BY PRESCRIBER/CLIN PHARMACIST DOCUMENTED: ICD-10-PCS | Mod: CPTII,,, | Performed by: NURSE PRACTITIONER

## 2023-08-14 PROCEDURE — 3074F SYST BP LT 130 MM HG: CPT | Mod: CPTII,,, | Performed by: NURSE PRACTITIONER

## 2023-08-14 PROCEDURE — 99215 OFFICE O/P EST HI 40 MIN: CPT | Mod: PBBFAC | Performed by: NURSE PRACTITIONER

## 2023-08-14 RX ORDER — EXENATIDE 2 MG/.85ML
INJECTION, SUSPENSION, EXTENDED RELEASE SUBCUTANEOUS
COMMUNITY

## 2023-08-14 NOTE — PROGRESS NOTES
Chief Complaint:   Chief Complaint   Patient presents with    Nephrolithiasis       HPI:  Patient is 62-year-old female here for follow-up for nephrolithiasis.  Patient has been on surveillance for nephrolithiasis for the past 2 years.  Pending KUB.  Today patient denies flank pain, groin pain, lower abdominal pain, evidence of passing stone, urinary frequency, urgency, incontinence, retention, dysuria, gross hematuria. Family history of stones, or urology pathology.     SAllergies:  Review of patient's allergies indicates:   Allergen Reactions    Sulfamethoxazole-trimethoprim Rash       Medications:  Current Outpatient Medications   Medication Sig Dispense Refill    blood sugar diagnostic (FREESTYLE LITE STRIPS MISC) FreeStyle Lite Strips   USE TO CHECK BLOOD SUGAR LEVEL EVERY MORNING      esomeprazole (NEXIUM) 40 MG capsule Take 40 mg by mouth.      exenatide microspheres (BYDUREON BCISE) 2 mg/0.85 mL AtIn Inject into the skin every 7 days.      gabapentin (NEURONTIN) 600 MG tablet gabapentin 600 mg tablet      lancets 25 gauge Misc lancets   to check cbg      levothyroxine (SYNTHROID, LEVOTHROID) 175 MCG tablet Take 175 mcg by mouth once daily.      losartan (COZAAR) 100 MG tablet Take 100 mg by mouth once daily.      metFORMIN (GLUCOPHAGE) 500 MG tablet SMARTSI Tablet(s) By Mouth Morning-Evening      metoprolol succinate (TOPROL-XL) 25 MG 24 hr tablet Take 25 mg by mouth once daily.      omega-3 fatty acids/fish oil (FISH OIL-OMEGA-3 FATTY ACIDS) 300-1,000 mg capsule Take by mouth once daily.      paroxetine (PAXIL) 30 MG tablet Take 60 mg by mouth once daily.      simvastatin (ZOCOR) 40 MG tablet simvastatin 40 mg tablet   Take 1 tablet every day by oral route.      tamsulosin (FLOMAX) 0.4 mg Cap TAKE 1 CAPSULE BY MOUTH EVERY DAY 90 capsule 0    vitamin D (VITAMIN D3) 1000 units Tab Take 1,000 Units by mouth.       Current Facility-Administered Medications   Medication Dose Route Frequency Provider Last Rate  Last Admin    LIDOcaine (PF) 10 mg/ml (1%) injection 10 mg  1 mL Intra-articular 1 time in Clinic/HOD Susan Webb MD        triamcinolone acetonide injection 40 mg  40 mg Intra-articular Once Susan Webb MD           Review of Systems:  General: No fever, chills, fatigability, or weight loss.  Skin: No rashes, itching, or changes in color or texture of skin.  Chest: Denies HAYES, cyanosis, wheezing, cough, and sputum production.  Abdomen: Appetite fine. No weight loss. Denies diarrhea, abdominal pain, hematemesis, or blood in stool.  Musculoskeletal: No joint stiffness or swelling. Denies back pain.  : As above.  All other review of systems negative.    PMH:  Past Medical History:   Diagnosis Date    Breast cancer     Diabetes mellitus     GERD (gastroesophageal reflux disease)     Hypertension        PSH:  Past Surgical History:   Procedure Laterality Date    BREAST LUMPECTOMY      CARPAL TUNNEL RELEASE      HYSTERECTOMY      KNEE SURGERY      TONSILLECTOMY         FamHx:  Family History   Problem Relation Age of Onset    Diabetes Mother     Cancer Mother     Hypertension Mother     Hyperlipidemia Mother     Hyperlipidemia Father     Hypertension Father     Diabetes Father     CAMILO disease Father     Prostate cancer Father     Heart attack Sister        SocHx:  Social History     Socioeconomic History    Marital status:    Tobacco Use    Smoking status: Former     Current packs/day: 0.00     Passive exposure: Past    Smokeless tobacco: Never   Substance and Sexual Activity    Alcohol use: Yes     Alcohol/week: 2.0 standard drinks of alcohol     Types: 2 Standard drinks or equivalent per week    Drug use: Never    Sexual activity: Yes     Partners: Male       Physical Exam:  Vitals:    08/14/23 1041   BP: 99/62   Pulse: 82   Resp: 20   Temp: 98.1 °F (36.7 °C)     General: A&Ox3, no apparent distress, no deformities  Neck: No masses, normal thyroid  Lungs: CTA corrine, no use of accessory  muscles  Heart: RRR, no arrhythmias  Abdomen: Soft, NT, ND, no masses, no hernias, no hepatosplenomegaly  Lymphatic: Neck and groin nodes negative  Skin: The skin is warm and dry. No jaundice.  Ext: No c/c/e.        Urinalysis:  Color, UA Yellow    Spec Grav UA >=1.030    pH, UA 5.5    WBC, UA trace    Nitrite, UA neg    Protein, POC neg    Glucose, UA neg    Ketones, UA neg    Urobilinogen, UA 0.2    Bilirubin, POC neg    Blood, UA neg               Specimen Collected: 08/14/23 10:59         Microscopic urinalysis revealed negative RBCs, nitrates, trace WBCs.      Impression:  1. Kidney stone on right side  - POCT URINE DIPSTICK WITH MICROSCOPE, AUTOMATED      Plan:  Instructed patient to get a KUB now and will notify patient of results when completed.  RTC 6 months with CT of the abdomen and pelvis without contrast stone protocol. Increase fluid intake, limit salt in diet, limit protein to 30%, intake adequate calcium, decrease brand, soy, Beets, Almonds, Rhubard, Buckwheat flour, baked potato, raspberry, potato chips Spinach, Miso, Tahini, sesame, Swiss Chard. Instructed patient on Avoiding bladder irritants, such as alcohol, citrus drinks or foods,salty foods, energy drinks, spicy foods, caffeine, sodas.

## 2023-08-14 NOTE — TELEPHONE ENCOUNTER
Spoke to patient via telephone regarding KUB results of right-sided kidney stone.  Instructed patient will recheck stone with CT of the abdomen pelvis without contrast stone protocol in 6 months.  Patient agree with plan.

## 2023-12-23 ENCOUNTER — HOSPITAL ENCOUNTER (EMERGENCY)
Facility: HOSPITAL | Age: 63
Discharge: HOME | End: 2023-12-23
Attending: INTERNAL MEDICINE
Payer: MEDICARE

## 2023-12-23 VITALS
HEART RATE: 82 BPM | RESPIRATION RATE: 20 BRPM | TEMPERATURE: 98 F | WEIGHT: 292 LBS | OXYGEN SATURATION: 99 % | BODY MASS INDEX: 53.06 KG/M2

## 2023-12-23 DIAGNOSIS — M79.603 ARM PAIN: Primary | ICD-10-CM

## 2023-12-23 DIAGNOSIS — M67.921 TENDINOPATHY OF RIGHT BICEPS TENDON: ICD-10-CM

## 2023-12-23 PROCEDURE — 63600175 PHARM REV CODE 636 W HCPCS: Performed by: PHYSICIAN ASSISTANT

## 2023-12-23 PROCEDURE — 25000003 PHARM REV CODE 250: Performed by: PHYSICIAN ASSISTANT

## 2023-12-23 PROCEDURE — 96372 THER/PROPH/DIAG INJ SC/IM: CPT | Performed by: PHYSICIAN ASSISTANT

## 2023-12-23 PROCEDURE — 99284 EMERGENCY DEPT VISIT MOD MDM: CPT

## 2023-12-23 RX ORDER — HYDROCODONE BITARTRATE AND ACETAMINOPHEN 5; 325 MG/1; MG/1
1 TABLET ORAL
Status: COMPLETED | OUTPATIENT
Start: 2023-12-23 | End: 2023-12-23

## 2023-12-23 RX ORDER — METHOCARBAMOL 500 MG/1
500 TABLET, FILM COATED ORAL 3 TIMES DAILY
Qty: 15 TABLET | Refills: 0 | Status: SHIPPED | OUTPATIENT
Start: 2023-12-23 | End: 2023-12-28

## 2023-12-23 RX ORDER — TRAMADOL HYDROCHLORIDE 50 MG/1
50 TABLET ORAL EVERY 12 HOURS PRN
Qty: 10 TABLET | Refills: 0 | Status: SHIPPED | OUTPATIENT
Start: 2023-12-23 | End: 2023-12-28

## 2023-12-23 RX ORDER — KETOROLAC TROMETHAMINE 30 MG/ML
30 INJECTION, SOLUTION INTRAMUSCULAR; INTRAVENOUS
Status: COMPLETED | OUTPATIENT
Start: 2023-12-23 | End: 2023-12-23

## 2023-12-23 RX ADMIN — KETOROLAC TROMETHAMINE 30 MG: 30 INJECTION, SOLUTION INTRAMUSCULAR; INTRAVENOUS at 12:12

## 2023-12-23 RX ADMIN — HYDROCODONE BITARTRATE AND ACETAMINOPHEN 1 TABLET: 5; 325 TABLET ORAL at 12:12

## 2023-12-23 NOTE — ED PROVIDER NOTES
Encounter Date: 12/23/2023       History     Chief Complaint   Patient presents with    Arm Pain     Right arm pain, non traumatic onset yesterday. Pain starts in right elbow and goes to right shoulder.      Patient reports to the emergency room with complaints of right-sided upper arm pain that radiates into the right shoulder starting yesterday; patient reports she was pushing herself up out of bed with her right arm extended and immediately felt a sharp pain in the area of the AC/distal humerus; patient reports continued pain with any movement or palpation    The history is provided by the patient.   Arm Pain  This is a new problem. The current episode started yesterday. The problem has not changed since onset.Pertinent negatives include no chest pain, no abdominal pain and no shortness of breath.     Review of patient's allergies indicates:   Allergen Reactions    Sulfamethoxazole-trimethoprim Rash     Past Medical History:   Diagnosis Date    Breast cancer     Diabetes mellitus     GERD (gastroesophageal reflux disease)     Hypertension      Past Surgical History:   Procedure Laterality Date    BREAST LUMPECTOMY      CARPAL TUNNEL RELEASE      HYSTERECTOMY      KNEE SURGERY      TONSILLECTOMY       Family History   Problem Relation Age of Onset    Diabetes Mother     Cancer Mother     Hypertension Mother     Hyperlipidemia Mother     Hyperlipidemia Father     Hypertension Father     Diabetes Father     CAMILO disease Father     Prostate cancer Father     Heart attack Sister      Social History     Tobacco Use    Smoking status: Former     Passive exposure: Past    Smokeless tobacco: Never   Substance Use Topics    Alcohol use: Yes     Alcohol/week: 2.0 standard drinks of alcohol     Types: 2 Standard drinks or equivalent per week    Drug use: Never     Review of Systems   Constitutional:  Negative for fever.   HENT:  Negative for sore throat.    Eyes: Negative.    Respiratory:  Negative for shortness of breath.     Cardiovascular:  Negative for chest pain.   Gastrointestinal:  Negative for abdominal pain and nausea.   Genitourinary:  Negative for dysuria.   Musculoskeletal:  Negative for back pain.   Skin:  Negative for rash.   Neurological:  Negative for weakness.   Hematological:  Does not bruise/bleed easily.   Psychiatric/Behavioral: Negative.         Physical Exam     Initial Vitals [12/23/23 1059]   BP Pulse Resp Temp SpO2   -- 86 18 98.1 °F (36.7 °C) 98 %      MAP       --         Physical Exam    Vitals reviewed.  Constitutional: She appears well-developed and well-nourished.   HENT:   Head: Normocephalic and atraumatic.   Eyes: Conjunctivae and EOM are normal. Pupils are equal, round, and reactive to light.   Neck: Neck supple.   Normal range of motion.  Cardiovascular:  Normal rate, regular rhythm, normal heart sounds and intact distal pulses.           Pulmonary/Chest: Breath sounds normal. No respiratory distress. She has no wheezes. She exhibits no tenderness.   Abdominal: Abdomen is soft. Bowel sounds are normal. There is no abdominal tenderness.   Musculoskeletal:         General: Normal range of motion.        Arms:       Cervical back: Normal range of motion and neck supple.      Comments: Tenderness to palpation of the distal humeral area; reproducible pain with flexion of the elbow as well as with raising of the arm     Neurological: She is alert and oriented to person, place, and time. She displays normal reflexes. No cranial nerve deficit or sensory deficit.   Skin: Skin is warm and dry.   Psychiatric: She has a normal mood and affect. Her behavior is normal. Judgment and thought content normal.         ED Course   Procedures  Labs Reviewed - No data to display       Imaging Results              X-Ray Humerus 2 View Right (Final result)  Result time 12/23/23 13:06:51      Final result by Sraah Cano MD (12/23/23 13:06:51)                   Impression:      No acute bony  abnormality.      Electronically signed by: Sarah Cano  Date:    12/23/2023  Time:    13:06               Narrative:    EXAMINATION:  XR HUMERUS 2 VIEW RIGHT    CLINICAL HISTORY:  Pain in arm, unspecified    COMPARISON:  None.    FINDINGS:  There is no acute fracture or malalignment.  The soft tissues are unremarkable.                                       Medications   HYDROcodone-acetaminophen 5-325 mg per tablet 1 tablet (1 tablet Oral Given 12/23/23 1249)   ketorolac injection 30 mg (30 mg Intramuscular Given 12/23/23 1249)     Medical Decision Making  Amount and/or Complexity of Data Reviewed  Radiology: ordered.    Risk  Prescription drug management.  Risk Details: Given strict ED return precautions. I have spoken with the patient and/or caregivers. I have explained the patient's condition, diagnoses and treatment plan based on the information available to me at this time. I have answered the patient's and/or caregiver's questions and addressed any concerns. The patient and/or caregivers have as good an understanding of the patient's diagnosis, condition and treatment plan as can be expected at this point. The vital signs have been stable. The patient's condition is stable and appropriate for discharge from the emergency department.      The patient will pursue further outpatient evaluation with the primary care physician or other designated or consulting physician as outlined in the discharge instructions. The patient and/or caregivers are agreeable to this plan of care and follow-up instructions have been explained in detail. The patient and/or caregivers have received these instructions in written format and have expressed an understanding of the discharge instructions. The patient and/or caregivers are aware that any significant change in condition or worsening of symptoms should prompt an immediate return to this or the closest emergency department or a call to 911.                                       Clinical Impression:  Final diagnoses:  [M79.603] Arm pain - started after pushing herself up out of bed; felt a pop; possible bicep tendonitis vs partial tear (Primary)  [M67.921] Tendinopathy of right biceps tendon          ED Disposition Condition    Discharge Stable          ED Prescriptions       Medication Sig Dispense Start Date End Date Auth. Provider    traMADoL (ULTRAM) 50 mg tablet Take 1 tablet (50 mg total) by mouth every 12 (twelve) hours as needed for Pain. 10 tablet 12/23/2023 12/28/2023 Jefferson Mccullough PA    methocarbamoL (ROBAXIN) 500 MG Tab Take 1 tablet (500 mg total) by mouth 3 (three) times daily. for 5 days 15 tablet 12/23/2023 12/28/2023 Jefferson Mccullough PA          Follow-up Information       Follow up With Specialties Details Why Contact Info    Cony Bourgeois MD Internal Medicine   [address] [place] [id number]  [phone]      discharge info    Discussed all pertinent ED information, results, diagnosis and treatment plan; All questions and concerns were addressed at this time. Patient voices understanding of information and instructions. Patient is comfortable with plan and discharge    discharge followup    If your symptoms become WORSE or you DO NOT IMPROVE and you are unable to reach your health care provider, you should RETURN to the emergency department             Jefferson Mccullough PA  12/23/23 1400       Jefferson Mccullough PA  12/23/23 1401

## 2024-02-01 ENCOUNTER — OFFICE VISIT (OUTPATIENT)
Dept: ORTHOPEDICS | Facility: CLINIC | Age: 64
End: 2024-02-01
Payer: MEDICARE

## 2024-02-01 VITALS
HEART RATE: 84 BPM | DIASTOLIC BLOOD PRESSURE: 81 MMHG | SYSTOLIC BLOOD PRESSURE: 137 MMHG | BODY MASS INDEX: 53.73 KG/M2 | HEIGHT: 62 IN | WEIGHT: 292 LBS

## 2024-02-01 DIAGNOSIS — M65.332 TRIGGER MIDDLE FINGER OF LEFT HAND: Primary | ICD-10-CM

## 2024-02-01 PROCEDURE — 99213 OFFICE O/P EST LOW 20 MIN: CPT | Mod: PBBFAC

## 2024-02-01 PROCEDURE — 96372 THER/PROPH/DIAG INJ SC/IM: CPT | Mod: PBBFAC

## 2024-02-01 RX ORDER — TRIAMCINOLONE ACETONIDE 40 MG/ML
40 INJECTION, SUSPENSION INTRA-ARTICULAR; INTRAMUSCULAR ONCE
Status: COMPLETED | OUTPATIENT
Start: 2024-02-01 | End: 2024-02-01

## 2024-02-01 RX ORDER — METOPROLOL SUCCINATE 25 MG/1
25 TABLET, EXTENDED RELEASE ORAL DAILY
COMMUNITY

## 2024-02-01 RX ORDER — SIMVASTATIN 40 MG/1
40 TABLET, FILM COATED ORAL NIGHTLY
COMMUNITY

## 2024-02-01 RX ORDER — PAROXETINE HYDROCHLORIDE 40 MG/1
1 TABLET, FILM COATED ORAL DAILY
COMMUNITY

## 2024-02-01 RX ORDER — LOSARTAN POTASSIUM 25 MG/1
25 TABLET ORAL
COMMUNITY
Start: 2023-11-21

## 2024-02-01 RX ORDER — ERGOCALCIFEROL 1.25 MG/1
CAPSULE ORAL
COMMUNITY

## 2024-02-01 RX ORDER — LEVOTHYROXINE SODIUM 175 UG/1
1 TABLET ORAL DAILY
COMMUNITY

## 2024-02-01 RX ORDER — LIDOCAINE HYDROCHLORIDE 10 MG/ML
1 INJECTION, SOLUTION EPIDURAL; INFILTRATION; INTRACAUDAL; PERINEURAL
Status: COMPLETED | OUTPATIENT
Start: 2024-02-01 | End: 2024-02-01

## 2024-02-01 RX ADMIN — LIDOCAINE HYDROCHLORIDE 10 MG: 10 INJECTION, SOLUTION EPIDURAL; INFILTRATION; INTRACAUDAL; PERINEURAL at 08:02

## 2024-02-01 RX ADMIN — TRIAMCINOLONE ACETONIDE 40 MG: 40 INJECTION, SUSPENSION INTRA-ARTICULAR; INTRAMUSCULAR at 08:02

## 2024-02-01 NOTE — PROGRESS NOTES
"Subjective:    Patient ID: Bharati Townsend is a right handed 63 y.o. female  who presented to [facility] for follow up..      Chief Complaint: Pain of the Left Hand      History of Present Illness:    Bharati Townsend who has a history of bilateral carpal tunnel release in 2010 in left middle finger trigger finger  presented today with left 3rd finger trigger finger worsening over the last 2 weeks.  She is noticed pain, catching and locking of the finger.  She does have a history of previous CSI to the left middle trigger finger 07/06/2023 and 10 2022 with excellent relief for 7 months or greater each time.  Today she is interested in having another CSI to the same finger. Occupation includes: retail, now retired.      Objective:      Physical Exam:    /81   Pulse 84   Ht 5' 2" (1.575 m)   Wt 132.5 kg (292 lb)   BMI 53.41 kg/m²     Appearance:  Soft tissue swelling: Left: no Right: no  Effusion: Left:  Negative Right: Negative  Erythema: Left no Right: no  Ecchymosis: Left: no Right: no  Atrophy: Left: no Right: no     Palpation:  Hand/wrist Tenderness: Left: A1 pulley of the left middle finger and all the MCP / PIP joints  Right: all mcp / pip joints     Range of motion:  Flexion (0-80): Left:  80 Right: 80  Extension (0-70): Left:  70 Right: 70  Ulnar deviation (0-30): 30 Right: 30  Radial deviation (0-20): 20 Right: 20  Supination (0-90): Left: 90 Right: 90  Pronation (0-90): Left: 90 Right: 90  Able to make a power fist and claw hand: on Both hand(s)  Distal palmar crease-finger tip distance: 0 on Both hand(s)     Strength:  Flexion: Left: 5/5 Pain: No Right: 5/5 Pain: No  Extension: Left: 5/5 Pain: No Right: 5/5 Pain: No  Supination: Left: 5/5 Pain: No Right: 5/5 Pain: No  Pronation: Left: 5/5 Pain: No Right: 5/5 Pain: No  Ulnar deviation: Left: 5/5 Pain: No Right: 5/5 Pain: No  Radial deviation: Left: 5/5 Pain: No Right: 5/5 Pain: No     Special " Tests:  Durkans Test (Carpal Compression test):      Left: Negative  Right: Negative  Tinels:  Left: Negative  Right: Negative    Phalens: Left: Negative          Right: Negative       AIN/PIN/Radial nerve: Intact and symmetric     General appearance: NAD  Peripheral pulses: normal bilaterally   Reflexes: Left: Not performed Right: Not performed   Sensation: normal     Labs:  Last A1c: The patient doesn't have any registry metric data available      Imaging:   Previous images reviewed.  X-rays ordered and performed today: no     Assessment:        Encounter Diagnoses   Code Name Primary?    M65.332 Trigger middle finger of left hand Yes        Plan:      Dx: Left middle finger trigger Finger.  Treatment Plan: Discussed with patient diagnosis and treatment recommendations.   - patient has received 2 CSI to the left middle finger trigger finger in the past with excellent relief for over 7 months each time.    - operative versus nonoperative management discussed and patient wishes to continue with conservative management.  -patient wishes to have CSI injection, tendon sheath CSI administered to the 3rd finger trigger finger, with immediate improvement in symptoms.  -avoid offending activities.  Imaging: prior radiological studies independently reviewed; discussed with patient; agree with radiologist interpretation.   Procedure:  See above.    Activity: Activity as tolerated  Therapy: No formal therapy  Medication: CONTINUE previously prescribed medication see list . Please see your primary care physician for further refills.  RTC: PRN; call if any issues.         Tendon Sheath    Date/Time: 2/1/2024 7:30 AM    Performed by: Bentley Quiles MD  Authorized by: Bentley Quiles MD    Consent Done?:  Yes (Written)  Indications:  Pain and diagnostic evaluation  Site marked: the procedure site was marked    Timeout: prior to procedure the correct patient, procedure, and site was verified    Local anesthesia used?: Yes    Local  anesthetic:  Topical anesthetic  Ultrasonic guidance for needle placement?: No    Needle size:  25 G  Approach:  Volar  Patient tolerance:  Patient tolerated the procedure well with no immediate complications    Additional Comments: Staff: Liseth Pimentel MD     Risks:  Possible complications with the injection include bleeding, infection (.01%), tendon rupture, steroid flare, fat pad or soft tissue atrophy, skin depigmentation, allergic reaction to medications and vasovagal response. (steroid flare treatment is rest, ice, NSAIDs and resolves in 24-36 hours.)    Consent:  No absolute contraindications (cellulitis overlying joint, infection, lack of informed consent, allergy to injection medication, AVN protein or egg allergy for sodium hyaluronate, or history of steroid flare) or relative contraindications (uncontrolled DM2 A1c>10, coagulopathy, INR > 3.5, previous joint replacement or history of AVN).        Description:  The patient was prepped in normal sterile fashion use of chlorhexidine scrub and the appropriate and anatomic landmarks were identified with ultrasound.  Contents of syringe included: 1cc of 1% of lidocaine with 40mg of Kenalog     Post Procedure: Patient alert, and moving all extremities. ROM improved, pain decreased.  Good peripheral pulses, no signs of vascular compromise and range of motion intact.  Aftercare instructions were given to patient at time of discharge.  Relative rest for 3 days-avoiding excess activity.  Place ice on the area for 15 minutes every 4-6 hours. Patient may take Tylenol a 1000 mg b.i.d. or ibuprofen 600 mg t.i.d. for the next 3-4 days if not on medication already and safe to take pending co-morbidities.  Protect the area for the next 1-8 hours if anesthetic was used.  Avoid excessive activity for the next 3-4 weeks.  ER precautions given for fever, severe joint pain or allergic reaction or other new symptoms related to the joint injection.       Patient had immediate  improvement in symptoms after injection.    Bentley Quiles MD.  Note dictated using MModal.

## 2024-02-13 NOTE — PROGRESS NOTES
Faculty Attestation: Bharati Townsend  was seen in [facility] Medicine Clinic. Discussed with Dr. Quiles at the time of the visit. History of Present Illness, Physical Exam, and Assessment and Plan reviewed. Treatment plan is reasonable and appropriate. Compliance with treatment recommendations is important.  Radiology images independently reviewed and agree with radiologist interpretation. Procedure note reviewed. Present for entire procedure with the fellow. Patient tolerated procedure well.      Liseth Pimentel MD  Sports Medicine

## 2024-02-16 ENCOUNTER — HOSPITAL ENCOUNTER (OUTPATIENT)
Dept: RADIOLOGY | Facility: HOSPITAL | Age: 64
Discharge: HOME | End: 2024-02-16
Attending: NURSE PRACTITIONER
Payer: MEDICARE

## 2024-02-16 DIAGNOSIS — N20.0 KIDNEY STONE ON RIGHT SIDE: ICD-10-CM

## 2024-02-16 PROCEDURE — 74176 CT ABD & PELVIS W/O CONTRAST: CPT | Mod: TC

## 2024-02-19 ENCOUNTER — OFFICE VISIT (OUTPATIENT)
Dept: UROLOGY | Facility: CLINIC | Age: 64
End: 2024-02-19
Payer: MEDICARE

## 2024-02-19 VITALS
HEART RATE: 75 BPM | RESPIRATION RATE: 20 BRPM | WEIGHT: 293 LBS | DIASTOLIC BLOOD PRESSURE: 71 MMHG | TEMPERATURE: 98 F | HEIGHT: 62 IN | BODY MASS INDEX: 53.92 KG/M2 | OXYGEN SATURATION: 97 % | SYSTOLIC BLOOD PRESSURE: 117 MMHG

## 2024-02-19 DIAGNOSIS — N20.0 KIDNEY STONE ON RIGHT SIDE: Primary | ICD-10-CM

## 2024-02-19 LAB
BILIRUB SERPL-MCNC: NORMAL MG/DL
BLOOD URINE, POC: NORMAL
COLOR, POC UA: YELLOW
GLUCOSE UR QL STRIP: NORMAL
KETONES UR QL STRIP: NORMAL
LEUKOCYTE ESTERASE URINE, POC: NORMAL
NITRITE, POC UA: NORMAL
PH, POC UA: 6
PROTEIN, POC: NORMAL
SPECIFIC GRAVITY, POC UA: >=1.03
UROBILINOGEN, POC UA: 1

## 2024-02-19 PROCEDURE — 4010F ACE/ARB THERAPY RXD/TAKEN: CPT | Mod: CPTII,,, | Performed by: NURSE PRACTITIONER

## 2024-02-19 PROCEDURE — 81001 URINALYSIS AUTO W/SCOPE: CPT | Mod: PBBFAC | Performed by: NURSE PRACTITIONER

## 2024-02-19 PROCEDURE — 3078F DIAST BP <80 MM HG: CPT | Mod: CPTII,,, | Performed by: NURSE PRACTITIONER

## 2024-02-19 PROCEDURE — 3074F SYST BP LT 130 MM HG: CPT | Mod: CPTII,,, | Performed by: NURSE PRACTITIONER

## 2024-02-19 PROCEDURE — 1159F MED LIST DOCD IN RCRD: CPT | Mod: CPTII,,, | Performed by: NURSE PRACTITIONER

## 2024-02-19 PROCEDURE — 3008F BODY MASS INDEX DOCD: CPT | Mod: CPTII,,, | Performed by: NURSE PRACTITIONER

## 2024-02-19 PROCEDURE — 1160F RVW MEDS BY RX/DR IN RCRD: CPT | Mod: CPTII,,, | Performed by: NURSE PRACTITIONER

## 2024-02-19 PROCEDURE — 99213 OFFICE O/P EST LOW 20 MIN: CPT | Mod: S$PBB,,, | Performed by: NURSE PRACTITIONER

## 2024-02-19 PROCEDURE — 99215 OFFICE O/P EST HI 40 MIN: CPT | Mod: PBBFAC | Performed by: NURSE PRACTITIONER

## 2024-02-19 NOTE — PROGRESS NOTES
Chief Complaint:   Chief Complaint   Patient presents with    Nephrolithiasis       HPI: Patient is 63-year-old female here for 6 month follow-up for nephrolithiasis.  Patient has been on surveillance for nephrolithiasis for the past 2 years.  Current CT abdomen pelvis without contrast as listed below.  Today patient denies flank pain, groin pain, lower abdominal pain, evidence of passing stone, urinary frequency, urgency, incontinence, retention, dysuria, gross hematuria.  Plan is RTC 6 months with renal ultrasound and follow kidney stone prevention instructions listed below.    Allergies:  Review of patient's allergies indicates:   Allergen Reactions    Sulfamethoxazole-trimethoprim Rash       Medications:  Current Outpatient Medications   Medication Sig Dispense Refill    blood sugar diagnostic (FREESTYLE LITE STRIPS MISC) FreeStyle Lite Strips   USE TO CHECK BLOOD SUGAR LEVEL EVERY MORNING      ergocalciferol (ERGOCALCIFEROL) 50,000 unit Cap       esomeprazole (NEXIUM) 40 MG capsule Take 40 mg by mouth.      exenatide microspheres (BYDUREON BCISE) 2 mg/0.85 mL AtIn Inject into the skin every 7 days.      gabapentin (NEURONTIN) 600 MG tablet gabapentin 600 mg tablet      lancets 25 gauge Misc lancets   to check cbg      levothyroxine (SYNTHROID, LEVOTHROID) 175 MCG tablet Take 1 tablet by mouth once daily.      losartan (COZAAR) 25 MG tablet Take 25 mg by mouth.      metFORMIN (GLUCOPHAGE) 500 MG tablet SMARTSI Tablet(s) By Mouth Morning-Evening      metoprolol succinate (TOPROL-XL) 25 MG 24 hr tablet Take 25 mg by mouth once daily.      paroxetine (PAXIL) 40 MG tablet Take 1 tablet by mouth once daily.      simvastatin (ZOCOR) 40 MG tablet Take 40 mg by mouth every evening.      vitamin D (VITAMIN D3) 1000 units Tab Take 1,000 Units by mouth.       Current Facility-Administered Medications   Medication Dose Route Frequency Provider Last Rate Last Admin    LIDOcaine (PF) 10 mg/ml (1%) injection 10 mg  1 mL  Intra-articular 1 time in Clinic/HOD Susan Webb MD        triamcinolone acetonide injection 40 mg  40 mg Intra-articular Once Susan Webb MD           Review of Systems:  General: No fever, chills, fatigability, or weight loss.  Skin: No rashes, itching, or changes in color or texture of skin.  Chest: Denies HAYES, cyanosis, wheezing, cough, and sputum production.  Abdomen: Appetite fine. No weight loss. Denies diarrhea, abdominal pain, hematemesis, or blood in stool.  Musculoskeletal: No joint stiffness or swelling. Denies back pain.  : As above.  All other review of systems negative.    PMH:  Past Medical History:   Diagnosis Date    Breast cancer     Diabetes mellitus     GERD (gastroesophageal reflux disease)     Hypertension        PSH:  Past Surgical History:   Procedure Laterality Date    BREAST LUMPECTOMY      CARPAL TUNNEL RELEASE      HYSTERECTOMY      KNEE SURGERY      TONSILLECTOMY         FamHx:  Family History   Problem Relation Age of Onset    Diabetes Mother     Cancer Mother     Hypertension Mother     Hyperlipidemia Mother     Hyperlipidemia Father     Hypertension Father     Diabetes Father     CAMILO disease Father     Prostate cancer Father     Heart attack Sister        SocHx:  Social History     Socioeconomic History    Marital status:    Tobacco Use    Smoking status: Former     Passive exposure: Past    Smokeless tobacco: Never   Substance and Sexual Activity    Alcohol use: Yes     Alcohol/week: 2.0 standard drinks of alcohol     Types: 2 Standard drinks or equivalent per week    Drug use: Never    Sexual activity: Yes     Partners: Male       Physical Exam:  Vitals:    02/19/24 1021   BP: 117/71   Pulse: 75   Resp: 20   Temp: 98.1 °F (36.7 °C)     General: A&Ox3, no apparent distress, no deformities  Neck: No masses, normal thyroid  Lungs: CTA corrine, no use of accessory muscles  Heart: RRR, no arrhythmias  Abdomen: Soft, NT, ND, no masses, no hernias, no  hepatosplenomegaly  Lymphatic: Neck and groin nodes negative  Skin: The skin is warm and dry. No jaundice.  Ext: No c/c/e.        Urinalysis:  Results for orders placed or performed in visit on 02/19/24   POCT URINE DIPSTICK WITH MICROSCOPE, AUTOMATED   Result Value Ref Range    Color, UA Yellow     Spec Grav UA >=1.030     pH, UA 6.0     WBC, UA trace     Nitrite, UA neg     Protein, POC neg     Glucose, UA neg     Ketones, UA neg     Urobilinogen, UA 1.0     Bilirubin, POC neg     Blood, UA neg    Microscopic urinalysis revealed negative RBCs, nitrites, trace WBCs.    Imaging:  CT of the abdomen pelvis without contrast on 02/16/2024 revealed: Punctate nonobstructing medullary calculi in both kidneys otherwise unremarkable         Impression:  1. Kidney stone on right side  - POCT URINE DIPSTICK WITH MICROSCOPE, AUTOMATED      Plan:  Instructed patient RTC 6 months with renal ultrasound.  Increase fluid intake, limit salt in diet, limit protein to 30%, intake adequate calcium, decrease brand, soy, Beets, Almonds, Rhubard, Buckwheat flour, baked potato, raspberry, potato chips Spinach, Miso, Tahini, sesame, Swiss Chard. Instructed patient on Avoiding bladder irritants, such as alcohol, citrus drinks or foods,salty foods, energy drinks, spicy foods, caffeine, sodas.

## 2024-06-24 DIAGNOSIS — G43.009 ATYPICAL MIGRAINE: Primary | ICD-10-CM

## 2024-07-31 ENCOUNTER — HOSPITAL ENCOUNTER (OUTPATIENT)
Dept: RADIOLOGY | Facility: HOSPITAL | Age: 64
Discharge: HOME | End: 2024-07-31
Attending: NURSE PRACTITIONER
Payer: MEDICARE

## 2024-07-31 DIAGNOSIS — N20.0 KIDNEY STONE ON RIGHT SIDE: ICD-10-CM

## 2024-07-31 PROCEDURE — 76775 US EXAM ABDO BACK WALL LIM: CPT | Mod: TC

## 2024-08-08 ENCOUNTER — OFFICE VISIT (OUTPATIENT)
Dept: NEUROLOGY | Facility: CLINIC | Age: 64
End: 2024-08-08
Payer: MEDICARE

## 2024-08-08 VITALS
SYSTOLIC BLOOD PRESSURE: 120 MMHG | BODY MASS INDEX: 53.92 KG/M2 | DIASTOLIC BLOOD PRESSURE: 78 MMHG | WEIGHT: 293 LBS | HEIGHT: 62 IN

## 2024-08-08 DIAGNOSIS — G45.9 TIA (TRANSIENT ISCHEMIC ATTACK): Primary | ICD-10-CM

## 2024-08-08 DIAGNOSIS — G43.009 ATYPICAL MIGRAINE: ICD-10-CM

## 2024-08-08 PROCEDURE — 1159F MED LIST DOCD IN RCRD: CPT | Mod: CPTII,S$GLB,, | Performed by: NURSE PRACTITIONER

## 2024-08-08 PROCEDURE — 3078F DIAST BP <80 MM HG: CPT | Mod: CPTII,S$GLB,, | Performed by: NURSE PRACTITIONER

## 2024-08-08 PROCEDURE — 4010F ACE/ARB THERAPY RXD/TAKEN: CPT | Mod: CPTII,S$GLB,, | Performed by: NURSE PRACTITIONER

## 2024-08-08 PROCEDURE — 3074F SYST BP LT 130 MM HG: CPT | Mod: CPTII,S$GLB,, | Performed by: NURSE PRACTITIONER

## 2024-08-08 PROCEDURE — 3008F BODY MASS INDEX DOCD: CPT | Mod: CPTII,S$GLB,, | Performed by: NURSE PRACTITIONER

## 2024-08-08 PROCEDURE — 99999 PR PBB SHADOW E&M-EST. PATIENT-LVL IV: CPT | Mod: PBBFAC,,, | Performed by: NURSE PRACTITIONER

## 2024-08-08 PROCEDURE — 99205 OFFICE O/P NEW HI 60 MIN: CPT | Mod: S$GLB,,, | Performed by: NURSE PRACTITIONER

## 2024-08-08 RX ORDER — PAROXETINE 30 MG/1
60 TABLET, FILM COATED ORAL DAILY
COMMUNITY
Start: 2024-07-25

## 2024-08-08 RX ORDER — PANTOPRAZOLE SODIUM 40 MG/1
40 TABLET, DELAYED RELEASE ORAL 2 TIMES DAILY
COMMUNITY
Start: 2024-06-24

## 2024-08-08 RX ORDER — LOSARTAN POTASSIUM 50 MG/1
50 TABLET ORAL DAILY
COMMUNITY
Start: 2024-07-25

## 2024-08-19 ENCOUNTER — OFFICE VISIT (OUTPATIENT)
Dept: UROLOGY | Facility: CLINIC | Age: 64
End: 2024-08-19
Payer: MEDICARE

## 2024-08-19 VITALS
BODY MASS INDEX: 53.92 KG/M2 | OXYGEN SATURATION: 99 % | TEMPERATURE: 98 F | HEIGHT: 62 IN | HEART RATE: 67 BPM | DIASTOLIC BLOOD PRESSURE: 83 MMHG | SYSTOLIC BLOOD PRESSURE: 129 MMHG | WEIGHT: 293 LBS | RESPIRATION RATE: 20 BRPM

## 2024-08-19 DIAGNOSIS — N20.0 KIDNEY STONE ON RIGHT SIDE: Primary | ICD-10-CM

## 2024-08-19 LAB
BILIRUB SERPL-MCNC: NORMAL MG/DL
BLOOD URINE, POC: NORMAL
COLOR, POC UA: YELLOW
GLUCOSE UR QL STRIP: NORMAL
KETONES UR QL STRIP: NORMAL
LEUKOCYTE ESTERASE URINE, POC: NORMAL
NITRITE, POC UA: NORMAL
PH, POC UA: 7
PROTEIN, POC: NORMAL
SPECIFIC GRAVITY, POC UA: 1.02
UROBILINOGEN, POC UA: 4

## 2024-08-19 PROCEDURE — 3079F DIAST BP 80-89 MM HG: CPT | Mod: CPTII,,, | Performed by: NURSE PRACTITIONER

## 2024-08-19 PROCEDURE — 3074F SYST BP LT 130 MM HG: CPT | Mod: CPTII,,, | Performed by: NURSE PRACTITIONER

## 2024-08-19 PROCEDURE — 99213 OFFICE O/P EST LOW 20 MIN: CPT | Mod: PBBFAC | Performed by: NURSE PRACTITIONER

## 2024-08-19 PROCEDURE — 81001 URINALYSIS AUTO W/SCOPE: CPT | Mod: PBBFAC | Performed by: NURSE PRACTITIONER

## 2024-08-19 PROCEDURE — 3008F BODY MASS INDEX DOCD: CPT | Mod: CPTII,,, | Performed by: NURSE PRACTITIONER

## 2024-08-19 PROCEDURE — 1159F MED LIST DOCD IN RCRD: CPT | Mod: CPTII,,, | Performed by: NURSE PRACTITIONER

## 2024-08-19 PROCEDURE — 1160F RVW MEDS BY RX/DR IN RCRD: CPT | Mod: CPTII,,, | Performed by: NURSE PRACTITIONER

## 2024-08-19 PROCEDURE — 99213 OFFICE O/P EST LOW 20 MIN: CPT | Mod: S$PBB,,, | Performed by: NURSE PRACTITIONER

## 2024-08-19 PROCEDURE — 4010F ACE/ARB THERAPY RXD/TAKEN: CPT | Mod: CPTII,,, | Performed by: NURSE PRACTITIONER

## 2024-08-19 RX ORDER — AMOXICILLIN 500 MG
CAPSULE ORAL DAILY
COMMUNITY

## 2024-08-19 RX ORDER — NAPROXEN SODIUM 220 MG/1
81 TABLET, FILM COATED ORAL DAILY
COMMUNITY

## 2024-08-19 NOTE — PROGRESS NOTES
Chief Complaint:   Chief Complaint   Patient presents with    kidney stone right side       HPI:   Patient is 63-year-old female here for 6 month follow-up for nephrolithiasis.    Patient has been on surveillance for nephrolithiasis for the past 2 years.    Current renal ultrasound as listed below.   Today patient denies any flank pain any urinary frequency, urgency, hesitancy.  Allergies:  Review of patient's allergies indicates:   Allergen Reactions    Sulfamethoxazole-trimethoprim Rash       Medications:  Current Outpatient Medications   Medication Sig Dispense Refill    aspirin 81 MG Chew Take 81 mg by mouth once daily.      blood sugar diagnostic (FREESTYLE LITE STRIPS MISC) FreeStyle Lite Strips   USE TO CHECK BLOOD SUGAR LEVEL EVERY MORNING      gabapentin (NEURONTIN) 600 MG tablet Take 600 mg by mouth 3 (three) times daily.      lancets 25 gauge Misc lancets   to check cbg      levothyroxine (SYNTHROID, LEVOTHROID) 175 MCG tablet Take 1 tablet by mouth once daily.      losartan (COZAAR) 50 MG tablet Take 50 mg by mouth once daily.      metFORMIN (GLUCOPHAGE) 500 MG tablet Take 500 mg by mouth 2 (two) times daily with meals.      metoprolol succinate (TOPROL-XL) 25 MG 24 hr tablet Take 25 mg by mouth once daily.      omega-3 fatty acids/fish oil (FISH OIL-OMEGA-3 FATTY ACIDS) 300-1,000 mg capsule Take by mouth once daily.      pantoprazole (PROTONIX) 40 MG tablet Take 40 mg by mouth 2 (two) times daily.      paroxetine (PAXIL) 30 MG tablet Take 60 mg by mouth once daily.      simvastatin (ZOCOR) 40 MG tablet Take 40 mg by mouth every evening.      vitamin D (VITAMIN D3) 1000 units Tab Take 2,000 Units by mouth once daily.       Current Facility-Administered Medications   Medication Dose Route Frequency Provider Last Rate Last Admin    LIDOcaine (PF) 10 mg/ml (1%) injection 10 mg  1 mL Intra-articular 1 time in Clinic/HOD Susan Webb MD        triamcinolone acetonide injection 40 mg  40 mg  Intra-articular Once Susan Webb MD           Review of Systems:  General: No fever, chills, fatigability, or weight loss.  Skin: No rashes, itching, or changes in color or texture of skin.  Chest: Denies HAYES, cyanosis, wheezing, cough, and sputum production.  Abdomen: Appetite fine. No weight loss. Denies diarrhea, abdominal pain, hematemesis, or blood in stool.  Musculoskeletal: No joint stiffness or swelling. Denies back pain.  : As above.  All other review of systems negative.    PMH:  Past Medical History:   Diagnosis Date    Breast cancer     Depression     Diabetes mellitus     GERD (gastroesophageal reflux disease)     Headache     High cholesterol     Hypertension     Lumbar disc disease     Obesity        PSH:  Past Surgical History:   Procedure Laterality Date    BREAST LUMPECTOMY      CARPAL TUNNEL RELEASE      HYSTERECTOMY      KNEE SURGERY      TONSILLECTOMY         FamHx:  Family History   Problem Relation Name Age of Onset    Diabetes Mother      Cancer Mother      Hypertension Mother      Hyperlipidemia Mother      Hyperlipidemia Father      Hypertension Father      Diabetes Father      CAMILO disease Father      Prostate cancer Father      Heart attack Sister         SocHx:  Social History     Socioeconomic History    Marital status:    Tobacco Use    Smoking status: Former     Passive exposure: Past    Smokeless tobacco: Never   Substance and Sexual Activity    Alcohol use: Yes     Alcohol/week: 2.0 standard drinks of alcohol     Types: 2 Standard drinks or equivalent per week    Drug use: Never    Sexual activity: Yes     Partners: Male     Social Determinants of Health     Transportation Needs: Unknown (12/13/2018)    Received from [facility], [facility]    PRAPARE - Transportation     Lack of Transportation (Medical): Patient declined      Lack of Transportation (Non-Medical): Patient declined       Physical Exam:  Vitals:    08/19/24 1000   BP: 129/83   Pulse: 67   Resp: 20   Temp: 97.8 °F (36.6 °C)     General: A&Ox3, no apparent distress, no deformities  Neck: No masses, normal thyroid  Lungs: CTA corrine, no use of accessory muscles  Heart: RRR, no arrhythmias  Abdomen: Soft, NT, ND, no masses, no hernias, no hepatosplenomegaly  Lymphatic: Neck and groin nodes negative  Skin: The skin is warm and dry. No jaundice.  Ext: No c/c/e.      Urinalysis:  Results for orders placed or performed in visit on 08/19/24   POCT URINE DIPSTICK WITH MICROSCOPE, AUTOMATED   Result Value Ref Range    Color, UA Yellow     Spec Grav UA 1.020     pH, UA 7.0     WBC, UA trace     Nitrite, UA neg     Protein, POC neg     Glucose, UA neg     Ketones, UA neg     Urobilinogen, UA 4.0     Bilirubin, POC neg     Blood, UA neg    Microscopic urinalysis negative for RBCs, negative for nitrites, trace WBCs.      Imaging:  Renal ultrasound 07/31/2024 revealed: No significant sonographic abnormality of the kidneys.       Impression:  1. Kidney stone on right side  - POCT URINE DIPSTICK WITH MICROSCOPE, AUTOMATED      Plan:  Instructed patient to continue surgery healing kidney stones with a renal ultrasound once a year.  Increase fluid intake, limit salt in diet, limit protein to 30%, intake adequate calcium, decrease brand, soy, Beets, Almonds, Rhubard, Buckwheat flour, baked potato, raspberry, potato chips Spinach, Miso, Tahini, sesame, Swiss Chard. Instructed patient on Avoiding bladder irritants, such as alcohol, citrus drinks or foods,salty foods, energy drinks, spicy foods, caffeine, sodas.  RTC 1 year with renal ultrasound.  Instructed patient if develops any abnormal urologic symptoms notify clinic to be re-evaluate treated or during after hours go to emergency room versus urgent here.                           [facility]

## 2024-08-21 ENCOUNTER — HOSPITAL ENCOUNTER (OUTPATIENT)
Dept: RADIOLOGY | Facility: HOSPITAL | Age: 64
Discharge: HOME | End: 2024-08-21
Attending: STUDENT IN AN ORGANIZED HEALTH CARE EDUCATION/TRAINING PROGRAM
Payer: MEDICARE

## 2024-08-21 ENCOUNTER — OFFICE VISIT (OUTPATIENT)
Dept: ORTHOPEDICS | Facility: CLINIC | Age: 64
End: 2024-08-21
Payer: MEDICARE

## 2024-08-21 VITALS
DIASTOLIC BLOOD PRESSURE: 56 MMHG | HEIGHT: 62 IN | WEIGHT: 293 LBS | BODY MASS INDEX: 53.92 KG/M2 | SYSTOLIC BLOOD PRESSURE: 120 MMHG | TEMPERATURE: 99 F | HEART RATE: 72 BPM

## 2024-08-21 DIAGNOSIS — M79.642 LEFT HAND PAIN: ICD-10-CM

## 2024-08-21 DIAGNOSIS — M65.332 TRIGGER MIDDLE FINGER OF LEFT HAND: Primary | ICD-10-CM

## 2024-08-21 PROCEDURE — 73130 X-RAY EXAM OF HAND: CPT | Mod: TC,LT

## 2024-08-21 PROCEDURE — 99214 OFFICE O/P EST MOD 30 MIN: CPT | Mod: PBBFAC,25

## 2024-08-21 RX ORDER — TRIAMCINOLONE ACETONIDE 40 MG/ML
40 INJECTION, SUSPENSION INTRA-ARTICULAR; INTRAMUSCULAR ONCE
Status: COMPLETED | OUTPATIENT
Start: 2024-08-21 | End: 2024-08-21

## 2024-08-21 RX ORDER — LIDOCAINE HYDROCHLORIDE 10 MG/ML
1 INJECTION, SOLUTION EPIDURAL; INFILTRATION; INTRACAUDAL; PERINEURAL
Status: COMPLETED | OUTPATIENT
Start: 2024-08-21 | End: 2024-08-21

## 2024-08-21 RX ORDER — LIOTHYRONINE SODIUM 25 UG/1
1 TABLET ORAL DAILY
COMMUNITY

## 2024-08-21 RX ADMIN — TRIAMCINOLONE ACETONIDE 40 MG: 40 INJECTION, SUSPENSION INTRA-ARTICULAR; INTRAMUSCULAR at 10:08

## 2024-08-21 RX ADMIN — LIDOCAINE HYDROCHLORIDE 10 MG: 10 INJECTION, SOLUTION EPIDURAL; INFILTRATION; INTRACAUDAL; PERINEURAL at 10:08

## 2024-08-21 NOTE — PROGRESS NOTES
"Subjective:    Patient ID: Bharati Townsend is a {handedness:908972::"right handed"} 63 y.o. female  who presented to [facility] for {new/follow-up/consult:94719}.      Chief Complaint: No chief complaint on file.      History of Present Illness:  HPI    Bharati Townsend {history:78599} presented today with {Diagnoses; hand:04925:a} involving the {laterality:77735} upper extremity for the past {0-10:11389} {DAYS, WEEKS ,MONTHS:68213}. Pain is located at {location:940565}. Quality of pain is described as {:583838}.  Radiates to {:761949}. Inciting event: {inciting event:80394}.  Pain is aggravated by {causes; pain shoulder:98395} . There {is/is not:9024} a history of {wrist hx:19508}. Evaluation to date: {prior evaluations:85686}. Treatment to date: {therapies:84872}. Expectations for today's visit includes ***.  Occupation includes ***. PCP is ***.    Hand Review of Systems:  Swelling?  {YES/NO:01208::"no"}  Instability?  {YES/NO:42005::"no"}  Clicking?  {YES/NO:95839::"no"}  Limited ROM? {YES/NO:97810::"no"}  Fever/Chills? {YES/NO:44286::"no"}  Subluxation? {YES/NO:34366::"no"}  Dislocation? {YES/NO:17634::"no"}  Numbness/Tingling? {YES/NO:05420::"no"}  Weakness? {YES/NO:94736::"no"}    Current Choice of Exercise:  {exercise options:69090::"none"}    ROS       Objective:      Physical Exam:    There were no vitals taken for this visit.    Ortho/SPM Exam    Appearance:  Soft tissue swelling: Left: {yes/no:27592::"no"} Right: {yes/no:38079::"no"}  Effusion: Left:  {[facility] PE -/+:14963::"Negative"} Right: {smc PE -/+:24184::"Negative"}  Erythema: Left {yes/no:37900::"no"} Right: {yes/no:91287::"no"}  Ecchymosis: Left: {yes/no:88631::"no"} Right: {yes/no:92007::"no"}  Atrophy: Left: {yes/no:48391::"no"} Right: {yes/no:40028::"no"}    Palpation:  Hand/wrist Tenderness: Left: {location:21035}  Right: {location:48329}    Range of motion:  Flexion (0-80): Left:  {numbers " "1-180:94881::"80"} Right: {numbers 1-180:32394::"80"}  Extension (0-70): Left:  {numbers 1-180:81745::"70"} Right: {numbers 1-180:82709::"70"}  Ulnar deviation (0-30): {Numbers; 0-50 (by 5):42765::"30"} Right: {Numbers; 0-50 (by 5):99601::"30"}  Radial deviation (0-20): {Numbers; 0-50 (by 5):26484::"20"} Right: {Numbers; 0-50 (by 5):43064::"20"}  Supination (0-90): Left: {numbers 1-180:32075::"90"} Right: {numbers 1-180:90860::"90"}  Pronation (0-90): Left: {numbers 1-180:14954::"90"} Right: {numbers 1-180:71774::"90"}  Able to make a power fist and claw hand: on {Right, left-initial cap:5607::"Both"} hand(s)  Distal palmar crease-finger tip distance: {NUMBERS; 0-10:5044::"0"} on {Right, left-initial cap:5607::"Both"} hand(s)    Strength:  Flexion: Left: {strength -/5:45849::"5/5"} Pain: {YES/NO:23434::"no"} Right: {strength -/5:85656::"5/5"} Pain: {YES/NO:45717::"no"}  Extension: Left: {strength -/5:57783::"5/5"} Pain: {YES/NO:65855::"no"} Right: {strength -/5:77748::"5/5"} Pain: {YES/NO:13604::"no"}  Supination: Left: {strength -/5:01263::"5/5"} Pain: {YES/NO:96928::"no"} Right: {strength -/5:48164::"5/5"} Pain: {YES/NO:79510::"no"}  Pronation: Left: {strength -/5:60665::"5/5"} Pain: {YES/NO:58838::"no"} Right: {strength -/5:08429::"5/5"} Pain: {YES/NO:12189::"no"}  Ulnar deviation: Left: {strength -/5:87012::"5/5"} Pain: {YES/NO:12565::"no"} Right: {strength -/5:08756::"5/5"} Pain: {YES/NO:54968::"no"}  Radial deviation: Left: {strength -/5:96538::"5/5"} Pain: {YES/NO:71930::"no"} Right: {strength -/5:40645::"5/5"} Pain: {YES/NO:90225::"no"}    Special Tests:  Durkans Test (Carpal Compression test): Left: {smc PE -/+:22489::"Negative"}  Right: {smc PE -/+:32400::"Negative"}  Tinels:  Left: {smc PE -/+:79190::"Negative"}  Right: {smc PE -/+:38065::"Negative"}    Phalens: Left: {smc PE -/+:09054::"Negative"}  Right: {smc PE -/+:72725::"Negative"}    Julian Litler test:  Left: {smc PE -/+:84560::"Negative"}  Right: " "{smc PE -/+:07919::"Negative"}    UCL laxity: Left: {smc PE -/+:43488::"Not performed"}  Right: {smc PE -/+:81443::"Not performed"}  FDP test: Left: {smc PE -/+:41478::"Negative"}  Right: {smc PE -/+:80149::"Negative"}  FDS test: Left: {smc PE -/+:15196::"Negative"}  Right: {smc PE -/+:04995::"Negative"}  Reverse Phalens: Left: {smc PE -/+:27490::"Not performed"} Right: {smc PE -/+:36785::"Not performed"}  Finkelstein's Test: Left: {smc PE -/+:35398::"Negative"} Right: {smc PE -/+:11489::"Negative"}    Froments: Left: {smc PE -/+:22613::"Not performed"} Right: {smc PE -/+:52176::"Not performed"}  Shuck Test: Left: {smc PE -/+:50077::"Not performed"} Right: {smc PE -/+:43990::"Not performed"}    AIN/PIN/Ulnar nerve: {intact and symmetric?:52816::"Intact and symmetric"}    General appearance: NAD  Peripheral pulses: {Pulses:73263::"normal bilaterally"}   Reflexes: Left: {smc PE -/+:55841::"Not performed"} Right: {smc PE -/+:68111::"Not performed"}   Sensation: {vibratory sensation:91530::"normal"}    Labs:  Last A1c: The patient doesn't have any registry metric data available     Imaging:   {Previous images reviewed:14216::"Previous images reviewed."}  X-rays ordered and performed today: {YES/NO:63522::"yes"}  # of views: {xray # views:60586::"3"} Laterality: {laterality:93109::"bilateral"}  My Interpretation:  Distal Radial ulnar joint space is overall {widening/decreased:55955::"Normal"} on AP views. Scapholunate interval distance is {widening/decreased:22812::"Normal"} on {laterality:31371::"bilateral"} AP views. A DISI/VISI {Is/is not:9024::"is not"} suggested on {laterality:55743::"bilateral"} hand series. Negative/positive ulnar variance {Is/is not:9024::"is not"} suggested on l{laterality:71045::"bilateral"} lateral views.  {Findings; injury xray:5769::"no fracture, dislocation, swelling or degenerative changes noted"}          Assessment:      No diagnosis found.     Plan:           No orders of the defined " "types were placed in this encounter.         MDM: {Prior external notes reviewed.:82422::"Prior external referring provider notes reviewed."} {Prior external studies reviewed:62300::"Prior external referring provider studies reviewed."}   Dx: {laterality:94928::"right"}. {Diagnoses; hand:68904:a}    Treatment Plan: Discussed with patient diagnosis and treatment recommendations.   {[facility] wrist plan:88348::"Natural history and expected course discussed. Questions answered.","Educational material distributed.","Reduction in offending activity.","Gentle ROM exercises.","Rest, ice, compression, and elevation (RICE) therapy.","Plain film x-rays.","Home physical therapy exercise handouts provided to patient. ","Over the counter NSAID and/or tylenol provided you do not have contraindications such as but not limited to liver or kidney disease or uncontrolled blood pressure. If you're doctors have told you to to not take them based on your health, do not take them. "}  Imaging: {imaging review:90670::"radiological studies ordered and independently reviewed; discussed with patient; pending radiologist interpretation"}.   Procedure: Discussed CSI/VSI as treatment options; {CSI/VSI options:03501::"discussed CSI vs VS injections as treatment options; since conservative measures did not improve symptoms patient consented for CSI today"}.  Activity: Activity as tolerated  Therapy: {therapy:71732::"Physical Therapy"}  Medication: {therapy med options:34387::"START over-the-counter acetaminophen (Tylenol 1000 mg three times per day as needed)"}. Please see your primary care physician for further refills.  RTC: {rtc options:76184::"PRN; call if any issues"}.         Procedures    "

## 2024-08-21 NOTE — PROGRESS NOTES
"Subjective:    Patient ID: Bharati Townsend is a right handed 63 y.o. female  who presented to [facility] for follow up..      Chief Complaint: Pain of the Left Hand      History of Present Illness:  Pain        Bharati Townsend a PMHx of bilateral carpal tunnel release (2010), left middle finger trigger finger (s/p 3 CSI, last injection 2/1/24) presented today with repeat trigger finger of the left middle finger. The patient states that the pain and catching has increased over the last 1.5mo. She is experiencing a trigger multiple times a day. She has been wearing a glove and a brace to bed to help prevent the triggering. The patient is in the process of a TIA workup, symptoms were a few months ago, after having numbness/tingling periorally. She is to have a holter monitor placed later today. She would like to continue with a steroid injection today but at her next visit would like to discuss surgical intervention if this reoccurs. Evaluation to date: plain films. Treatment to date: avoidance of activity, oral analgesics, and CSI. Expectations for today's visit includes steroid injection and follow up with ortho in a few months to discuss future surgical intervention. PCP is Dr. Bourgeois.    Hand Review of Systems:  Swelling?  no  Instability?  no  Clicking?  yes  Limited ROM? yes  Fever/Chills? no  Subluxation? no  Dislocation? no  Numbness/Tingling? no  Weakness? no         Objective:      Physical Exam:    BP (!) 120/56   Pulse 72   Temp 98.6 °F (37 °C)   Ht 5' 2" (1.575 m)   Wt 132.9 kg (293 lb)   BMI 53.59 kg/m²     Ortho/SPM Exam    Appearance:  Soft tissue swelling: Left: no Right: no  Effusion: Left:  Negative Right: Negative  Erythema: Left no Right: no  Ecchymosis: Left: no Right: no  Atrophy: Left: no Right: no    Palpation:  Hand/wrist Tenderness: Left: none  Right: none    Range of motion:  Flexion (0-80): Left:  80 Right: 80  Extension (0-70): Left:  70 " Right: 70  Ulnar deviation (0-30): 30 Right: 30  Radial deviation (0-20): 20 Right: 20  Supination (0-90): Left: 90 Right: 90  Pronation (0-90): Left: 90 Right: 90  Able to make a power fist and claw hand: on Both hand(s)    Strength:  Flexion: Left: 5/5 Pain: no Right: 5/5 Pain: no  Extension: Left: 5/5 Pain: no Right: 5/5 Pain: no  Supination: Left: 5/5 Pain: no Right: 5/5 Pain: no  Pronation: Left: 5/5 Pain: no Right: 5/5 Pain: no  Ulnar deviation: Left: 5/5 Pain: no Right: 5/5 Pain: no  Radial deviation: Left: 5/5 Pain: no Right: 5/5 Pain: no    Special Tests:  FDP test: Left: Negative  Right: Negative  FDS test: Left: Negative  Right: Negative      AIN/PIN/Ulnar nerve: Intact and symmetric    General appearance: NAD  Peripheral pulses: normal bilaterally   Reflexes: Left: Not performed Right: Not performed   Sensation: normal    Labs:  Last A1c: The patient doesn't have any registry metric data available     Imaging:   Previous images reviewed.  X-rays ordered and performed today: yes  # of views: 3 Laterality: bilateral  My Interpretation:  no acute fracture or dislocation noted, pending official read from radiology.         Assessment:        Encounter Diagnoses   Code Name Primary?    M65.332 Trigger middle finger of left hand Yes    M79.642 Left hand pain         Plan:        Dx: Left middle trigger finger  Treatment Plan: Discussed with patient diagnosis and treatment recommendations.   Natural history and expected course discussed. Questions answered.  Reduction in offending activity.  Gentle ROM exercises.  Rest, ice, compression, and elevation (RICE) therapy.  Plain film x-rays.  Home physical therapy exercise handouts provided to patient.   Over the counter NSAID and/or tylenol provided you do not have contraindications such as but not limited to liver or kidney disease or uncontrolled blood pressure. If you're doctors have told you to to not take them based on your health, do not take them.   Imaging:  radiological studies ordered and independently reviewed; discussed with patient; pending radiologist interpretation.   Procedure: Discussed CSI/VSI as treatment options; discussed CSI vs VS injections as treatment options; since conservative measures did not improve symptoms patient consented for CSI today.  Activity: Activity as tolerated  Therapy: No formal therapy  Medication: START over-the-counter acetaminophen (Tylenol 1000 mg three times per day as needed). Please see your primary care physician for further refills.  RTC: PRN; call if any issues, if patient has re-occurrence she would like to discuss surgical intervention with ortho.         Tendon Sheath    Date/Time: 8/21/2024 8:30 AM    Performed by: Yahaira Mancia MD  Authorized by: Yahaira Mancia MD    Consent Done?:  Yes (Written)  Indications:  Pain  Prep: patient was prepped and draped in usual sterile fashion      Local anesthesia used?: Yes    Local anesthetic:  Topical anesthetic  Location:  Long finger  Site:  L long flexor tendon sheath  Ultrasonic guidance for needle placement?: Yes    Needle size:  22 G  Approach:  Volar  Patient tolerance:  Patient tolerated the procedure well with no immediate complications    Additional Comments: .Staff: Susan Webb MD    Risks:  Possible complications with the injection include bleeding, infection (.01%), tendon rupture, steroid flare, fat pad or soft tissue atrophy, skin depigmentation, allergic reaction to medications and vasovagal response. (steroid flare treatment is rest, ice, NSAIDs and resolves in 24-36 hours.)    Consent:  No absolute contraindications (cellulitis overlying joint, infection, lack of informed consent, allergy to injection medication, AVN protein or egg allergy for sodium hyaluronate, or history of steroid flare) or relative contraindications (uncontrolled DM2 A1c>10, coagulopathy, INR > 3.5, previous joint replacement or history of AVN).        Description:  The patient was  prepped in normal sterile fashion use of chlorhexidine scrub and the appropriate and anatomic landmarks were identified with ultrasound.  Contents of syringe included: 5cc of 1% of lidocaine with 40mg of Kenalog     Post Procedure: Patient alert, and moving all extremities. ROM improved, pain decreased.  Good peripheral pulses, no signs of vascular compromise and range of motion intact.  Aftercare instructions were given to patient at time of discharge.  Relative rest for 3 days-avoiding excess activity.  Place ice on the area for 15 minutes every 4-6 hours. Patient may take Tylenol a 1000 mg b.i.d. or ibuprofen 600 mg t.i.d. for the next 3-4 days if not on medication already and safe to take pending co-morbidities.  Protect the area for the next 1-8 hours if anesthetic was used.  Avoid excessive activity for the next 3-4 weeks.  ER precautions given for fever, severe joint pain or allergic reaction or other new symptoms related to the joint injection.         This note is dictated using the M*Modal Fluency Direct word recognition program. There are word recognition mistakes that are occasionally missed on review.     Yahaira Mancia MD  Sports Medicine Fellow

## 2024-10-18 RX ORDER — SODIUM CHLORIDE 0.9 % (FLUSH) 0.9 %
10 SYRINGE (ML) INJECTION
OUTPATIENT
Start: 2024-10-18

## 2024-10-21 ENCOUNTER — HOSPITAL ENCOUNTER (OUTPATIENT)
Facility: HOSPITAL | Age: 64
Discharge: HOME | End: 2024-10-21
Attending: INTERNAL MEDICINE | Admitting: INTERNAL MEDICINE
Payer: MEDICARE

## 2024-10-21 VITALS
OXYGEN SATURATION: 98 % | SYSTOLIC BLOOD PRESSURE: 107 MMHG | HEART RATE: 65 BPM | TEMPERATURE: 98 F | BODY MASS INDEX: 55.32 KG/M2 | DIASTOLIC BLOOD PRESSURE: 65 MMHG | RESPIRATION RATE: 12 BRPM | HEIGHT: 61 IN | WEIGHT: 293 LBS

## 2024-10-21 DIAGNOSIS — R94.8 ABNORMAL PET SCAN, MEDIASTINUM: ICD-10-CM

## 2024-10-21 DIAGNOSIS — I10 HTN (HYPERTENSION): ICD-10-CM

## 2024-10-21 DIAGNOSIS — R07.9 CHEST PAIN: ICD-10-CM

## 2024-10-21 LAB
OHS QRS DURATION: 78 MS
OHS QTC CALCULATION: 439 MS
POCT GLUCOSE: 94 MG/DL (ref 70–110)

## 2024-10-21 PROCEDURE — 25500020 PHARM REV CODE 255: Performed by: INTERNAL MEDICINE

## 2024-10-21 PROCEDURE — 25000003 PHARM REV CODE 250: Performed by: INTERNAL MEDICINE

## 2024-10-21 PROCEDURE — C1887 CATHETER, GUIDING: HCPCS | Performed by: INTERNAL MEDICINE

## 2024-10-21 PROCEDURE — C1894 INTRO/SHEATH, NON-LASER: HCPCS | Performed by: INTERNAL MEDICINE

## 2024-10-21 PROCEDURE — C1769 GUIDE WIRE: HCPCS | Performed by: INTERNAL MEDICINE

## 2024-10-21 PROCEDURE — 93458 L HRT ARTERY/VENTRICLE ANGIO: CPT | Performed by: INTERNAL MEDICINE

## 2024-10-21 PROCEDURE — 93010 ELECTROCARDIOGRAM REPORT: CPT | Mod: ,,, | Performed by: INTERNAL MEDICINE

## 2024-10-21 PROCEDURE — 63600175 PHARM REV CODE 636 W HCPCS: Performed by: INTERNAL MEDICINE

## 2024-10-21 PROCEDURE — 93005 ELECTROCARDIOGRAM TRACING: CPT | Mod: 59

## 2024-10-21 RX ORDER — SODIUM CHLORIDE 9 MG/ML
INJECTION, SOLUTION INTRAVENOUS CONTINUOUS
Status: DISCONTINUED | OUTPATIENT
Start: 2024-10-21 | End: 2024-10-21 | Stop reason: HOSPADM

## 2024-10-21 RX ORDER — VERAPAMIL HYDROCHLORIDE 2.5 MG/ML
INJECTION, SOLUTION INTRAVENOUS
Status: DISCONTINUED | OUTPATIENT
Start: 2024-10-21 | End: 2024-10-21 | Stop reason: HOSPADM

## 2024-10-21 RX ORDER — FENTANYL CITRATE 50 UG/ML
INJECTION, SOLUTION INTRAMUSCULAR; INTRAVENOUS
Status: DISCONTINUED | OUTPATIENT
Start: 2024-10-21 | End: 2024-10-21 | Stop reason: HOSPADM

## 2024-10-21 RX ORDER — HYDRALAZINE HYDROCHLORIDE 20 MG/ML
10 INJECTION INTRAMUSCULAR; INTRAVENOUS EVERY 4 HOURS PRN
Status: DISCONTINUED | OUTPATIENT
Start: 2024-10-21 | End: 2024-10-21 | Stop reason: HOSPADM

## 2024-10-21 RX ORDER — NITROGLYCERIN 20 MG/100ML
INJECTION INTRAVENOUS
Status: DISCONTINUED | OUTPATIENT
Start: 2024-10-21 | End: 2024-10-21 | Stop reason: HOSPADM

## 2024-10-21 RX ORDER — DIAZEPAM 5 MG/1
10 TABLET ORAL
Status: DISCONTINUED | OUTPATIENT
Start: 2024-10-21 | End: 2024-10-21 | Stop reason: HOSPADM

## 2024-10-21 RX ORDER — CLOPIDOGREL BISULFATE 300 MG/1
600 TABLET, FILM COATED ORAL ONCE
Status: COMPLETED | OUTPATIENT
Start: 2024-10-21 | End: 2024-10-21

## 2024-10-21 RX ORDER — ACETAMINOPHEN 325 MG/1
650 TABLET ORAL EVERY 4 HOURS PRN
Status: DISCONTINUED | OUTPATIENT
Start: 2024-10-21 | End: 2024-10-21 | Stop reason: HOSPADM

## 2024-10-21 RX ORDER — MORPHINE SULFATE 4 MG/ML
2 INJECTION, SOLUTION INTRAMUSCULAR; INTRAVENOUS EVERY 4 HOURS PRN
Status: DISCONTINUED | OUTPATIENT
Start: 2024-10-21 | End: 2024-10-21 | Stop reason: HOSPADM

## 2024-10-21 RX ORDER — LIDOCAINE HYDROCHLORIDE 10 MG/ML
INJECTION, SOLUTION INFILTRATION; PERINEURAL
Status: DISCONTINUED | OUTPATIENT
Start: 2024-10-21 | End: 2024-10-21 | Stop reason: HOSPADM

## 2024-10-21 RX ORDER — HYDROCODONE BITARTRATE AND ACETAMINOPHEN 5; 325 MG/1; MG/1
1 TABLET ORAL EVERY 4 HOURS PRN
Status: DISCONTINUED | OUTPATIENT
Start: 2024-10-21 | End: 2024-10-21 | Stop reason: HOSPADM

## 2024-10-21 RX ORDER — HEPARIN SODIUM 1000 [USP'U]/ML
INJECTION, SOLUTION INTRAVENOUS; SUBCUTANEOUS
Status: DISCONTINUED | OUTPATIENT
Start: 2024-10-21 | End: 2024-10-21 | Stop reason: HOSPADM

## 2024-10-21 RX ORDER — ONDANSETRON HYDROCHLORIDE 2 MG/ML
4 INJECTION, SOLUTION INTRAVENOUS EVERY 8 HOURS PRN
Status: DISCONTINUED | OUTPATIENT
Start: 2024-10-21 | End: 2024-10-21 | Stop reason: HOSPADM

## 2024-10-21 RX ORDER — DIPHENHYDRAMINE HCL 25 MG
50 CAPSULE ORAL
Status: DISCONTINUED | OUTPATIENT
Start: 2024-10-21 | End: 2024-10-21 | Stop reason: HOSPADM

## 2024-10-21 RX ORDER — SODIUM CHLORIDE 9 MG/ML
INJECTION, SOLUTION INTRAVENOUS ONCE
Status: DISCONTINUED | OUTPATIENT
Start: 2024-10-21 | End: 2024-10-21 | Stop reason: HOSPADM

## 2024-10-21 RX ORDER — MIDAZOLAM HYDROCHLORIDE 1 MG/ML
INJECTION INTRAMUSCULAR; INTRAVENOUS
Status: DISCONTINUED | OUTPATIENT
Start: 2024-10-21 | End: 2024-10-21 | Stop reason: HOSPADM

## 2024-10-21 RX ORDER — IOPAMIDOL 755 MG/ML
INJECTION, SOLUTION INTRAVASCULAR
Status: DISCONTINUED | OUTPATIENT
Start: 2024-10-21 | End: 2024-10-21 | Stop reason: HOSPADM

## 2024-10-21 RX ADMIN — DIAZEPAM 10 MG: 5 TABLET ORAL at 05:10

## 2024-10-21 RX ADMIN — CLOPIDOGREL BISULFATE 600 MG: 300 TABLET, FILM COATED ORAL at 05:10

## 2024-10-21 RX ADMIN — DIPHENHYDRAMINE HYDROCHLORIDE 50 MG: 25 CAPSULE ORAL at 05:10

## 2024-10-21 NOTE — INTERVAL H&P NOTE
Patient name: Bharati Townsend  MRN: [id number]  : 1960  Cath Lab Procedure H&P Update    Pre-Procedure Assessment:    I saw and examined the patient face to face. The patient has been re-evaluated and her condition is unchanged. The reason for admission, procedure and care is still present.  Based on the patients H&P, pre-procedure physical exam, relevant diagnostic studies, NPO status and information obtained from the patient, I determined the patient is an appropriate candidate for the proposed procedure and anesthesia planned. I further certify the anesthesia risks, benefits and options have been explained to the patient to which she agrees as documented on the procedural consent.

## 2024-10-21 NOTE — Clinical Note
The catheter was inserted into the and was inserted over the wire into the left ventricle. Hemodynamics were performed.  and Pullback was recorded.  EDP 33

## 2024-10-21 NOTE — DISCHARGE INSTRUCTIONS
-Remove dressing and armboard in 24 hrs.  -Can shower in 24 hrs, use soap and water only.  -No driving for two days.  -Do not lift anything heavier than a gallon of milk for 5 days.  -Do not submerge site under water for 5 days.  -No lotions, powders, creams around site for 5 days.  -Return to the nearest emergency room if you start running a fever, have any kind of discharge coming from the site, the site looks red or swollen.  -If the site starts to bleed, lay flat on the ground and apply pressure to the site and call 911.    not applicable

## 2024-10-21 NOTE — Clinical Note
The catheter was removed from the and was repositioned into the ostium   left main. Hemodynamics were performed.  An angiography was performed of the left coronary arteries. Multiple views were taken. The angiography was performed via power injection.

## 2024-10-21 NOTE — DISCHARGE SUMMARY
[facility] - Cath Lab Services  Discharge Note  Short Stay    Procedure(s) (LRB):  Angiogram, Coronary, with Left Heart Cath (N/A)      OUTCOME: Patient tolerated treatment/procedure well without complication and is now ready for discharge.    DISPOSITION: Home or Self Care    FINAL DIAGNOSIS:  <principal problem not specified>    FOLLOWUP: In clinic    DISCHARGE INSTRUCTIONS:  No discharge procedures on file.     TIME SPENT ON DISCHARGE:

## 2025-03-19 ENCOUNTER — OFFICE VISIT (OUTPATIENT)
Dept: UROLOGY | Facility: CLINIC | Age: 65
End: 2025-03-19
Payer: MEDICARE

## 2025-03-19 VITALS
BODY MASS INDEX: 55.32 KG/M2 | DIASTOLIC BLOOD PRESSURE: 77 MMHG | HEART RATE: 69 BPM | WEIGHT: 293 LBS | TEMPERATURE: 98 F | SYSTOLIC BLOOD PRESSURE: 118 MMHG | HEIGHT: 61 IN | RESPIRATION RATE: 20 BRPM

## 2025-03-19 DIAGNOSIS — R30.0 DYSURIA: ICD-10-CM

## 2025-03-19 DIAGNOSIS — N20.0 KIDNEY STONES: Primary | ICD-10-CM

## 2025-03-19 LAB
BILIRUB SERPL-MCNC: NORMAL MG/DL
BLOOD URINE, POC: NORMAL
COLOR, POC UA: YELLOW
GLUCOSE UR QL STRIP: NORMAL
KETONES UR QL STRIP: NORMAL
LEUKOCYTE ESTERASE URINE, POC: NORMAL
NITRITE, POC UA: NORMAL
PH, POC UA: 7
PROTEIN, POC: NORMAL
SPECIFIC GRAVITY, POC UA: 1.01
UROBILINOGEN, POC UA: 0.2

## 2025-03-19 PROCEDURE — 99215 OFFICE O/P EST HI 40 MIN: CPT | Mod: PBBFAC | Performed by: NURSE PRACTITIONER

## 2025-03-19 PROCEDURE — 81001 URINALYSIS AUTO W/SCOPE: CPT | Mod: PBBFAC | Performed by: NURSE PRACTITIONER

## 2025-03-19 PROCEDURE — 1159F MED LIST DOCD IN RCRD: CPT | Mod: CPTII,,, | Performed by: NURSE PRACTITIONER

## 2025-03-19 PROCEDURE — 99213 OFFICE O/P EST LOW 20 MIN: CPT | Mod: S$PBB,,, | Performed by: NURSE PRACTITIONER

## 2025-03-19 PROCEDURE — 87077 CULTURE AEROBIC IDENTIFY: CPT | Performed by: NURSE PRACTITIONER

## 2025-03-19 PROCEDURE — 1160F RVW MEDS BY RX/DR IN RCRD: CPT | Mod: CPTII,,, | Performed by: NURSE PRACTITIONER

## 2025-03-19 PROCEDURE — 3074F SYST BP LT 130 MM HG: CPT | Mod: CPTII,,, | Performed by: NURSE PRACTITIONER

## 2025-03-19 PROCEDURE — 3078F DIAST BP <80 MM HG: CPT | Mod: CPTII,,, | Performed by: NURSE PRACTITIONER

## 2025-03-19 PROCEDURE — 4010F ACE/ARB THERAPY RXD/TAKEN: CPT | Mod: CPTII,,, | Performed by: NURSE PRACTITIONER

## 2025-03-19 PROCEDURE — 3008F BODY MASS INDEX DOCD: CPT | Mod: CPTII,,, | Performed by: NURSE PRACTITIONER

## 2025-03-19 NOTE — PROGRESS NOTES
Placed in room. Seen by Pete Guerrero NP. Spoke with patient. Urine sent for C&S to lab. RTC in 6 months with a renal U/S.

## 2025-03-19 NOTE — PROGRESS NOTES
Placed in room. Seen by Pete Molina NP. Spoke with patient.  Chief Complaint:   Chief Complaint   Patient presents with    check for bladder infection       HPI:   Patient is 63-year-old female here for 6 month follow-up for nephrolithiasis.    Patient has been on surveillance for nephrolithiasis for the past 2 years.    Current renal ultrasound as listed below.   Today patient presents with left lower abdominal discomfort intermittent for the past 3 days she feels as if she has a UTI.  Discussed with patient will notify her of urine culture if positive if it was negative I will send patient for a CT of the abdomen pelvis without contrast to evaluate possible cause of kidney stone.  RTC 6 months with renal ultrasound.    Allergies:  Review of patient's allergies indicates:   Allergen Reactions    Sulfamethoxazole-trimethoprim Rash       Medications:  Current Medications[1]    Review of Systems:  General: No fever, chills, fatigability, or weight loss.  Skin: No rashes, itching, or changes in color or texture of skin.  Chest: Denies HAYES, cyanosis, wheezing, cough, and sputum production.  Abdomen: Appetite fine. No weight loss. Denies diarrhea, abdominal pain, hematemesis, or blood in stool.  Musculoskeletal: No joint stiffness or swelling. Denies back pain.  : As above.  All other review of systems negative.    PMH:  Past Medical History:   Diagnosis Date    Breast cancer     Depression     Diabetes mellitus     Diabetes mellitus, type 2     GERD (gastroesophageal reflux disease)     Headache     High cholesterol     Hypertension     Lumbar disc disease     Obesity     ALTAGRACIA on CPAP        PSH:  Past Surgical History:   Procedure Laterality Date    ANGIOGRAM, CORONARY, WITH LEFT HEART CATHETERIZATION N/A 10/21/2024    Procedure: Angiogram, Coronary, with Left Heart Cath;  Surgeon: Pablo Buckner MD;  Location: [facility] CATH LAB;  Service: Cardiology;  Laterality: N/A;  [facility] VIA RRA    BREAST LUMPECTOMY      CARPAL  TUNNEL RELEASE       SECTION       &     HYSTERECTOMY      KNEE SURGERY      TONSILLECTOMY         FamHx:  Family History   Problem Relation Name Age of Onset    Diabetes Mother Mom     Cancer Mother Mom         Colon Cancer    Hypertension Mother Mom     Hyperlipidemia Mother Mom     Arthritis Mother Mom     Depression Mother Mom     Hyperlipidemia Father Dad     Hypertension Father Dad     Diabetes Father Dad     CAMILO disease Father Dad     Prostate cancer Father Dad     Arthritis Father Dad     Cancer Father Dad         Prostate Cancer    Hearing loss Father Dad     Heart attack Sister      Cancer Maternal Grandmother Shi Moulton         Stomach Cancer    Diabetes Maternal Grandmother Shi Moulton     Arthritis Sister Doreen Cox         Knee replacement, shoulder replacement    Heart disease Sister Doreen Cox     Arthritis Sister Mansi Patrice     Diabetes Sister Mansi Patrice     Hypertension Sister Mansi Patrice     Kidney disease Sister Mansi Patrice         Stage 3    Arthritis Brother Brandon Patrice     Cancer Brother Brandon Patrice         Prostate Cancer    Diabetes Brother Brandon Patrice     Hypertension Brother Brandon Patrice     Arthritis Brother Delano Patrice     Cancer Brother Delano Patrice         Colon Cancer    Diabetes Brother Delano Patrice     Heart disease Brother Delano Patrice     Stroke Brother Delano Patrice     Cancer Maternal Uncle Shereen Moulton jr.         Lukemia    Diabetes Maternal Uncle Shereen Moulton jr.     Hypertension Maternal Uncle Abrahamlisa Kenney liang.     Cancer Paternal Aunt Nilam Diaz         Breast Cancer    Cancer Daughter Ludy Perez'         Uterine cancer    Hypertension Sister Sherie Ibrahim     Learning disabilities Sister Sherie Ibrahim                SocHx:  Social History[2]    Physical Exam:  Vitals:    25 1034   BP: 118/77   Pulse: 69   Resp: 20   Temp: 98.1 °F (36.7 °C)     General: A&Ox3, no apparent distress, no deformities  Neck: No masses, normal  thyroid  Lungs: CTA corrine, no use of accessory muscles  Heart: RRR, no arrhythmias  Abdomen: Soft, NT, ND, no masses, no hernias, no hepatosplenomegaly  Lymphatic: Neck and groin nodes negative  Skin: The skin is warm and dry. No jaundice.  Ext: No c/c/e.        Urinalysis:   Component  Ref Range & Units (hover) 11:23   Color, UA Yellow   Spec Grav UA 1.015   pH, UA 7.0   WBC, UA small   Nitrite, UA neg   Protein, POC neg   Glucose, UA neg   Ketones, UA neg   Urobilinogen, UA 0.2   Bilirubin, POC neg   Blood, UA neg      Microscopic urinalysis revealed WBCs 1-2 per HPF, epithelial cells 2 per HPF, negative RBCs, negative nitrites.       Specimen Collected: 03/19/25 11:23 CDT Last Resulted: 03/19/25 11:23 CDT           Impression:  1. Kidney stones  - POCT URINE DIPSTICK WITH MICROSCOPE, AUTOMATED      Plan:  Instructed patient sending UA for culture if abnormal will notify patient results and treat accordingly.  If her urine is negative for any abnormal findings we will send patient for a CT of the abdomen without contrast stone protocol and notify patient results when completed.  Increase fluid intake, limit salt in diet, limit protein to 30%, intake adequate calcium, decrease brand, soy, Beets, Almonds, Rhubard, Buckwheat flour, baked potato, raspberry, potato chips Spinach, Miso, Tahini, sesame, Swiss Chard. Instructed patient on Avoiding bladder irritants, such as alcohol, citrus drinks or foods,salty foods, energy drinks, spicy foods, caffeine, sodas.   RTC six-month with renal ultrasound.  Instructed patient if develops any abnormal urologic symptoms notify clinic to be re-evaluate treated or during after hours go to emergency room versus urgent here.                           [facility]         [1]   Current Outpatient Medications   Medication Sig Dispense Refill    aspirin 81 MG Chew Take 81 mg by mouth once daily.      gabapentin (NEURONTIN) 600 MG tablet Take 600 mg by mouth 3 (three) times daily.       levothyroxine (SYNTHROID, LEVOTHROID) 175 MCG tablet Take 1 tablet by mouth once daily.      metFORMIN (GLUCOPHAGE) 500 MG tablet Take 500 mg by mouth 2 (two) times daily with meals.      metoprolol succinate (TOPROL-XL) 25 MG 24 hr tablet Take 25 mg by mouth once daily.      omega-3 fatty acids/fish oil (FISH OIL-OMEGA-3 FATTY ACIDS) 300-1,000 mg capsule Take by mouth once daily.      pantoprazole (PROTONIX) 40 MG tablet Take 40 mg by mouth 2 (two) times daily.      paroxetine (PAXIL) 30 MG tablet Take 60 mg by mouth once daily.      simvastatin (ZOCOR) 40 MG tablet Take 40 mg by mouth every evening.      vitamin D (VITAMIN D3) 1000 units Tab Take 2,000 Units by mouth once daily.      blood sugar diagnostic (FREESTYLE LITE STRIPS MISC) FreeStyle Lite Strips   USE TO CHECK BLOOD SUGAR LEVEL EVERY MORNING (Patient not taking: Reported on 3/19/2025)      lancets 25 gauge Misc lancets   to check cbg (Patient not taking: Reported on 3/19/2025)      liothyronine (CYTOMEL) 25 MCG Tab Take 1 tablet by mouth once daily.      losartan (COZAAR) 50 MG tablet Take 50 mg by mouth once daily.       Current Facility-Administered Medications   Medication Dose Route Frequency Provider Last Rate Last Admin    LIDOcaine (PF) 10 mg/ml (1%) injection 10 mg  1 mL Intra-articular 1 time in Clinic/HOD Susan Webb MD        triamcinolone acetonide injection 40 mg  40 mg Intra-articular Once Susan Webb MD       [2]   Social History  Socioeconomic History    Marital status:    Tobacco Use    Smoking status: Former     Current packs/day: 0.00     Average packs/day: 0.3 packs/day for 20.0 years (5.0 ttl pk-yrs)     Types: Cigarettes     Start date: 1979     Quit date: 1999     Years since quittin.6     Passive exposure: Past    Smokeless tobacco: Never    Tobacco comments:     [place] cancer   Substance and Sexual Activity    Alcohol use: Yes     Alcohol/week: 2.0 standard drinks of alcohol     Types: 2 Standard  drinks or equivalent per week    Drug use: Never    Sexual activity: Yes     Partners: Male     Birth control/protection: None     Social Drivers of Health     Transportation Needs: Unknown (12/13/2018)    Received from [facility]    ERNESTO - Transportation     Lack of Transportation (Medical): Patient declined     Lack of Transportation (Non-Medical): Patient declined

## 2025-03-21 DIAGNOSIS — N30.01 ACUTE CYSTITIS WITH HEMATURIA: Primary | ICD-10-CM

## 2025-03-21 LAB — BACTERIA UR CULT: ABNORMAL

## 2025-03-21 RX ORDER — LEVOFLOXACIN 500 MG/1
500 TABLET, FILM COATED ORAL DAILY
Qty: 7 TABLET | Refills: 0 | Status: SHIPPED | OUTPATIENT
Start: 2025-03-21

## 2025-04-08 ENCOUNTER — OFFICE VISIT (OUTPATIENT)
Dept: ORTHOPEDICS | Facility: CLINIC | Age: 65
End: 2025-04-08
Payer: MEDICARE

## 2025-04-08 VITALS
HEART RATE: 65 BPM | TEMPERATURE: 98 F | DIASTOLIC BLOOD PRESSURE: 79 MMHG | WEIGHT: 293 LBS | HEIGHT: 60 IN | BODY MASS INDEX: 57.52 KG/M2 | SYSTOLIC BLOOD PRESSURE: 129 MMHG

## 2025-04-08 DIAGNOSIS — M65.332 TRIGGER MIDDLE FINGER OF LEFT HAND: Primary | ICD-10-CM

## 2025-04-08 PROCEDURE — 99215 OFFICE O/P EST HI 40 MIN: CPT | Mod: PBBFAC

## 2025-04-08 RX ORDER — FLUOROMETHOLONE 1 MG/ML
SUSPENSION/ DROPS OPHTHALMIC
COMMUNITY
Start: 2025-03-24

## 2025-04-08 RX ORDER — EZETIMIBE 10 MG/1
10 TABLET ORAL
COMMUNITY
Start: 2025-02-12

## 2025-04-08 RX ORDER — DIPHENHYDRAMINE HCL 25 MG
CAPSULE ORAL
COMMUNITY

## 2025-04-08 RX ORDER — LORATADINE 10 MG/1
TABLET ORAL
COMMUNITY

## 2025-04-08 RX ORDER — ESOMEPRAZOLE MAGNESIUM 40 MG/1
CAPSULE, DELAYED RELEASE ORAL
COMMUNITY

## 2025-04-08 NOTE — PROGRESS NOTES
Subjective:   Patient ID: Bharati Townsend is a right handed 64 y.o. female who presented to [facility] for follow up.    Chief Complaint: Pain of the Left Hand    History of Present Illness:  Bharati Townsend presents to the clinic today for left middle finger trigger finger that has been worsening over the last 4 weeks.  She has had increasing triggering or she has had to manually straighten her finger over the last few weeks.  She has had increasing pain over the A1 pulley as well.  She has been wearing a compression hand sleeve and taking anti-inflammatories. Pain is aggravated by repetitive use, grasping things. There is not a history of injury. Evaluation to date: plain films. Treatment to date: avoidance of activity, oral analgesics, and CSI. She had bilateral carpal tunnel release (2010), left middle finger trigger finger (s/p 4 CSI, last injection 8/21/24) Expectations for today's visit includes steroid injection and follow up with ortho in a few months to discuss future surgical intervention. PCP is Dr. Bourgeois.     Hand Review of Systems:  Swelling?  no  Instability?  no  Clicking?  yes  Limited ROM? yes  Fever/Chills? no  Subluxation? no  Dislocation? no  Numbness/Tingling? no  Weakness? no    Comorbid Conditions:  Morbidly Obese  DM - well controlled    Objective:     Physical Exam:  /79 (Patient Position: Sitting)   Pulse 65   Temp 97.6 °F (36.4 °C)   Ht 5' (1.524 m)   Wt (!) 137.9 kg (304 lb)   BMI 59.37 kg/m²     Appearance:  Soft tissue swelling: Left: no Right: no  Effusion: Left:  Negative Right: Negative  Erythema: Left no Right: no  Ecchymosis: Left: no Right: no  Atrophy: Left: no Right: no    Palpation:  Hand/wrist Tenderness: Left: A1 pulley at middle finger  Right: none    Range of motion:  Flexion (0-80): Left:  80 Right: 80  Extension (0-70): Left:  70 Right: 70  Ulnar deviation (0-30): 30 Right: 30  Radial deviation (0-20): 20 Right:  20  Supination (0-90): Left: 90 Right: 90  Pronation (0-90): Left: 90 Right: 90  Able to make a power fist and claw hand: on Both hand(s)  Distal palmar crease-finger tip distance: 0 on Both hand(s)    Strength:  Flexion: Left: 5/5 Pain: no Right: 5/5 Pain: no  Extension: Left: 5/5 Pain: no Right: 5/5 Pain: no  Supination: Left: 5/5 Pain: no Right: 5/5 Pain: no  Pronation: Left: 5/5 Pain: no Right: 5/5 Pain: no  Ulnar deviation: Left: 5/5 Pain: no Right: 5/5 Pain: no  Radial deviation: Left: 5/5 Pain: no Right: 5/5 Pain: no    Special Tests:  FDP test: Left: Negative  Right: Negative  FDS test: Left: Negative  Right: Negative    AIN/PIN/Ulnar nerve: Intact and symmetric    General appearance: NAD  Peripheral pulses: normal bilaterally   Reflexes: Left: Not performed Right: Not performed   Sensation: normal      Imaging:   Previous images reviewed.  X-rays ordered and performed today: yes  Left hand XR on 8/21/24, My Interpretation:  No fractures or dislocations noted.     Assessment:     Encounter Diagnoses   Code Name Primary?    M65.332 Trigger middle finger of left hand Yes       Plan:     Dx:  Left middle finger trigger finger - failure to improve with conservative treatment  Treatment Plan: Discussed with patient diagnosis and treatment recommendations.   Discussed with the patient due to her multiple trigger finger injections in the past to the same left middle finger recommend a surgical evaluation for trigger finger release.  She has had improvement with this CSI in the past but has not gotten extended relief with it.  We will have the patient follow up with orthopedic surgeon for further surgical evaluation.  Imaging: prior radiological studies independently reviewed; discussed with patient; agree with radiologist interpretation.   Procedure: Discussed injection as a treatment option; recommend referral to orthopedics for surgical evaluation.  Activity: Activity as tolerated  Therapy: HEP  Medication: START  Voltaren Gel 1% as prescribed to affected area. Please see your primary care physician for further refills.  RTC: Follow up with orthopedic surgeon for surgical evaluation.      This note is dictated using the M*Modal Fluency Direct word recognition program. There are word recognition mistakes that are occasionally missed on review.     Yahaira Mancia MD  Sports Medicine Fellow

## 2025-04-09 NOTE — PROGRESS NOTES
Faculty Attestation: Bharati Townsend  was seen in Sports Medicine Clinic. Services were furnished in a primary care sports medicine center in the outpatient department at a [facility] hospital. Discussed with Dr. Mancia at the time of the visit. History of Present Illness, Physical Exam, and Assessment and Plan reviewed.  I participated in the management of the patient and was immediately available throughout the encounter. Treatment plan is reasonable and appropriate. Compliance with treatment recommendations is important.    Radiology images independently reviewed and agree with radiologist interpretation. No procedure was performed.     Liseth Pimentel MD  Sports Medicine      Insurance: Medicare  Patient Type: Established patient to [facility]  Problem Type: Established condition to [facility]  Condition: Chronic Condition - progression of symptoms that have failed improvement with previous conservative treatment

## 2025-04-16 ENCOUNTER — CLINICAL SUPPORT (OUTPATIENT)
Dept: ORTHOPEDICS | Facility: CLINIC | Age: 65
End: 2025-04-16
Payer: MEDICARE

## 2025-04-16 VITALS
DIASTOLIC BLOOD PRESSURE: 79 MMHG | SYSTOLIC BLOOD PRESSURE: 126 MMHG | HEART RATE: 68 BPM | HEIGHT: 60 IN | RESPIRATION RATE: 18 BRPM | BODY MASS INDEX: 57.52 KG/M2 | WEIGHT: 293 LBS | OXYGEN SATURATION: 98 % | TEMPERATURE: 99 F

## 2025-04-16 DIAGNOSIS — M65.332 TRIGGER MIDDLE FINGER OF LEFT HAND: Primary | ICD-10-CM

## 2025-04-16 DIAGNOSIS — M65.332 ACQUIRED TRIGGER FINGER OF LEFT MIDDLE FINGER: ICD-10-CM

## 2025-04-16 PROCEDURE — 99215 OFFICE O/P EST HI 40 MIN: CPT | Mod: PBBFAC

## 2025-04-16 RX ORDER — MUPIROCIN 20 MG/G
OINTMENT TOPICAL
OUTPATIENT
Start: 2025-04-16

## 2025-04-16 RX ORDER — CEFAZOLIN SODIUM 2 G/50ML
2 SOLUTION INTRAVENOUS
OUTPATIENT
Start: 2025-04-16

## 2025-04-16 NOTE — LETTER
[facility]  [address] [address]  Phone: [phone]       [facility] - Orthopedic Department  Surgery Clearance Request Form  Patients Name: Bharati Townsend  : 1960  Today's Date: 2025  Dr. Buckner ,        The above named patient is scheduled to have a left middle finger trigger release  on 25.    Attending Surgeon: Alan Muhammad MD (Al) - [facility]   Resident: Vahid Jackson MD  Type of Anesthesia: Local MAC   Requesting Staff Name: Leandra Back LPN    This patient needs surgical clearance from your office for this procedure.    Please perform necessary tests in order for this patient to be medically cleared for surgery.     PLEASE FAX THE CLEARANCE LETTER WITH THE MOST RECENT DIAGNOSTICS AND PROGRESS NOTES TO US AT (098).  PLEASE BE SURE TO INCLUDE ANY MEDICATION INSTRUCTIONS THAT SHOULD BE HELD PRIOR TO SURGERY.     CHECK ALL THAT APPLY AND COMPLETE THE BLANKS:   [x] Request for cardiac risk assessment for procedure stated above.   (Please fax us the risk assessment/clearance letter with the most recent ECHO, EKG or stress test.)   [x] Medication instructions prior to surgery.  ASA or any other cardiac medication that need to be stoped  (Please specify if you recommend the patient stop any medications prior to surgery and how many days prior to surgery.)   [x] Request for Cardiology clearance for the procedure stated above.   (Please fax us the clearance letter with the most recent diagnostics and progress notes.)    We appreciate your help in getting our patient cleared for surgery.    Please feel free to call our office should you have any questions.     Thank you,   Alan Muhammad MD (Al)       Phone Number:  [phone]  Fax Number:  [phone]

## 2025-04-16 NOTE — LETTER
[facility]  [address] [place] [address]  Phone: [phone]     How to Prepare for Surgery (Orthopedic)  About this topic   There are some things that are common for most kinds of surgery. These help to keep you safe. Learn what you can do to get ready for your surgery. This will help you and the team caring for you during your surgery. Also, learn about the things the doctor and nurses do to keep you safe during surgery.  You may have outpatient surgery where you come in the day of your surgery and then go home hours after your surgery.  In other cases, you may be admitted to the hospital the day before your surgery, or you may have to stay in the hospital after your surgery.  General   Weeks before your surgery:  These are things you can do to lower your chances of having problems after your surgery:  Stop smoking if you are a smoker. Avoid being around others who smoke for several weeks before and after your surgery. Smoking limits how much oxygen gets to your body for healing.  If you have diabetes, keep your blood sugars as near normal as you can. This helps lower your chance of infection or other problems after your surgery.  Talk to your doctor about all the drugs you take. This includes over-the-counter drugs, natural products, and vitamins.  There may be some you can take the day of your surgery. If you have diabetes, talk to your doctor about your drugs, especially if you are on insulin. Your doctor may tell you to take less than usual on the day of your surgery.  If you take insulin or any diabetic medication do not take the morning of surgery. __________________________________________________________________________________  Your doctor may want you to stop taking some of your drugs before surgery. Some drugs and natural products make it harder for your blood to clot. Then, you may have more bleeding during or after surgery. Be sure to tell your doctor if you  are taking any drugs that may cause bleeding.   Common Medication Perioperative Consideration Stop prior to surgery   Aspirin Risk for Bleeding 7 days   NSAID's (antiinflammatories) Examples: Mobic/Meloxicam  Advil/Motrin/Ibuprofen/Aleve  Naproixen/Naprosyn  Voltaren/Diclofenac/Arthotec  /Celebrex/Celecoxib  /Toradol/Ketorolac  Ralafen/Nabumetone/Arthotec Risk for Bleeding 7 days   Phentermine/Diet Pills  Anesthesia, BP, Cardiac Side Effects 7 days   GIP/Glp-1 Agonist  Examples: Trulicity (Dulaglutide) Ozempic/Wegovy (Semaglutide) Victoza/Saxenda (liraglutide) Mounjaro (Trizepatide) Delayed Gastric Emptying causing Increase risk of aspiration in surgery 14 days   Omega-3/Fish oil Increase risk for bleeding 7 days    Medication that needs to be stopped _______ASA  Clopidogrel_________will call you when to stop them  ____________________________________________________________________    Herbal Supplements you should stop     Herbal Supplement Operative Concerns Stop before Surgery   Echinacea Can be associated with allergic reaction 7 days   Ephedra (Mahung) Increase blood pressure and heart rate with anesthesia 7 days   All Vitamins, CBD supplement, Garlic, Jewels, Tumeric,Umary Increase risk of bleeding 7 days   Gingko Biloba Increase risk of bleeding 7 days   Ginseng Low Glucose, decrease effects of coumadin 7 days   Kava Increased sedation with anesthesia  7 days   Rajendra Wort Multiple drug interaction with coumadin, steroids, and some heart medication 7 days   Valerian Increase sedation Taper dose  14 days before surgery   Phentermine Cardiac side effects 7 days   Fenfluramine (Phen Phen) Cardiac side effects 7 days      Have the tests done that your doctor orders before your surgery. Your doctor may want you to have lab tests, an x-ray, or EKG to see how healthy you are. Having these tests helps the doctor plan for your care during surgery.  Mild exercise like walking, riding a bike, or swimming may be good  for you. It may help you breathe more easily before and after surgery. Ask your doctor if it is OK for you to exercise before surgery. Also, practice taking deep breaths. Often after surgery, you will be asked to take deep breaths and cough. This may help prevent lung infections.  Follow a healthy diet and eat nutritious, well-balanced meals. Eat a healthy meal for supper the day before your surgery. Avoid beer, wine, and mixed drinks (alcohol).  You will not be allowed to drive right away after surgery. Ask a family member or a friend to drive you home.  Most often your doctor will want you to have an adult stay with you for at least 24 hours. Arrange with family or friends to stay with you on the first day after your surgery or when you go home.  If you get sick with a cold, infection, or other illness in the 2 weeks before your surgery, call your doctor's office. You may need to change when you have surgery because you may be more at risk for infection or other problems.  Check your skin for rashes, cuts, insect bites, or any skin infections and report these to your doctor before surgery. Pay attention to the areas that you cannot see easily, such as the bottoms of your feet and back. Check in skin folds for any bumps or skin rashes.  Shaving should be stopped at least 2 days before surgery on all areas of the body including the face, legs, underarms, etc.   Day before and morning of your surgery:  Wash your hair and take a bath or shower before your surgery (around 7 pm). The doctor may give you special soap or wipes to wash with before the surgery to lessen the germs on your skin. Follow the directions how to use this special soap or wipes provided by your doctor. Your skin should be completely dry and cool. When applied to sensitive skin, the cloths may irritate the skin such as temporary itching sensation and /or redness.  If itching or redness persists, rinse affected area and discontinue use.  Clean skin in  the following order using one cloth for each step.   AVOID CONTACT WITH EYES, EARS, MOUTH,AND MUCOUS MEMBRANES (VAGINAL OR RECTAL).                     YOU MUST USE ALL OF THE CLOTHS  Cloth #1  Wipe YOUR Neck and chest.  Cloth #2  Wipe both arms to fingertips and both armpits.  Cloth #3  Wipe both hips followed by groin area. Be sure to wipe the folds of your stomach and groin.  DO NOT use on vaginal and rectal areas.  Cloth #4  Wipe both legs starting at the thigh and ending at the toes.  Cloth #5  Wipe your back.  Cloth #6  Wipe your buttocks.  Do not use body lotions, perfumes, powders, or creams on your skin, especially near the surgical site. Do not wear makeup, especially eye makeup.  You will also need to take off nail polish and jewelry. Remove any jewelry from body piercings.  Stop eating and drinking at the time you are told to. This helps to make sure that your stomach is empty while you are under anesthesia.  Brush your teeth before your surgery. Take extra care not to drink any water while you brush. If your child is having surgery, watch that your child does not drink any water while they brush.  Leave all valuables and jewelry at home.  What to expect when you arrive for surgery:  At the hospital or surgery center, the staff will work to get you ready for your surgery. Here are some of the things that will happen before you get to the operating room:  You will have a bracelet that has your name, birth date, and other information on it. Staff may check this bracelet often or ask you your name and birthdate. This is a safety check to make sure they have the right patient.  If you have allergies, you may have to wear a bracelet with them listed on it. You may also have a bracelet to tell staff that you are at a higher risk of falling.  If you have a history of sleep apnea and use a CPAP machine for sleep, please bring the CPAP with you if you are spending the night after your surgery.  You will change  out of your clothing and wear a hospital gown. You will need to take off your glasses and remove contact lenses, hearing aids, and dentures before your surgery.  The staff will:  Give you warm blankets or use a warming blanket so you are not cold.  Take your temperature and blood pressure. They may also ask about or check your height and weight.  Ask questions about your health history and allergies. You may have to tell this information to others as well. This is another safety check.  Put an IV in your hand or arm to give you fluids and drugs. You may be given a drug to make you sleepy.  The staff may:  Give you a special mouthwash to use. This helps lower the number of germs in your mouth.  Put special stockings or boots on your legs and feet to help with blood flow.  Use clippers to remove hair around the area of your surgical cut, depending on the site of your surgery.  The anesthesia team will:  Ask about your history and allergies.  Ask you to sign a consent after they speak with you about their anesthesia plan for you.  Want to know if you have any loose teeth before your surgery.  Talk with you about your surgery and what they will do to keep you comfortable during surgery.  Have you meet people who will be in the operating room with you.  Your doctor will:  Talk with you about your surgery and the risks and benefits. Be sure to ask any questions you may have. You may need to sign a consent form if you have not already done so.  Talk with you about the possible need for blood products. You may be asked to sign a consent for blood products as well.  Sign or mychal the part of your body where you will be having surgery. This is a safety check.    What will the results be?   You will be ready for your surgery.  What drugs may be needed?   The doctor may order drugs before your surgery to:  Help with fear or worry and help you to relax  Prevent infection  Prevent blood clots  The doctor may order drugs after  surgery to:  Help lessen pain  Help prevent or lessen upset stomach  Prevent infection  When do I need to call the doctor?   Signs of infection. These include a fever of 100.4°F (38°C) or higher, chills.  If you have a cough, cold, fever, or become ill a few days before you are scheduled to have surgery. Your doctor may want to reschedule it.  If you do not have a ride to and from your surgery  If you have any skin rashes, cuts, boils, or infections on your skin. Your doctor may want to reschedule the surgery since this can put you at risk for wound infection after your surgery.  Helpful tips   Wear loose clothing and comfortable shoes that are easy to put on and take off.  Remove all body piercings before you come for your surgery.  Bring these things with you to the hospital:  Your insurance card and photo ID  A copy of your advance directive if you have one  A list of all drugs that you take and the amounts that you take  A list of all your allergies and the kind of reaction they cause you  Make sure you will be able to move about your home easily after your surgery. You may need extra pillows or a recliner to rest in.  Have foods in your home that will be easy on your belly after surgery. You may want things like juices, crackers, soups, and Jello.

## 2025-04-16 NOTE — PROGRESS NOTES
[facility]  Established Patient Office Visit  2025       Patient ID: Bharati Townsend  YOB: 1960  MRN: [id number]      Chief Complaint:    Bharati Townsend is a 64 y.o. female who who was referred from her primary care sports medicine colleagues for refractory left middle finger trigger finger.  She has tried and failed multiple rounds of injections, activity modification and bracing and continues to have painful triggering of the left middle finger.  She is interested in surgical intervention for this.  Denies recurrence of her carpal tunnel symptoms.  She also states she did see a rheumatologist in the past and was diagnosed with osteoarthritis in bilateral hands but was negative for rheumatologic conditions.        Occupation:  Retired  Sports/Hobbies:  Knitting and crafting  Hand dominance: right handed  Smoking:  No    Past Medical History:    Past Medical History:   Diagnosis Date    Breast cancer     Depression     Diabetes mellitus     Diabetes mellitus, type 2     GERD (gastroesophageal reflux disease)     Headache     High cholesterol     Hypertension     Lumbar disc disease     Obesity     ALTAGRACIA on CPAP      Past Surgical History:   Procedure Laterality Date    ANGIOGRAM, CORONARY, WITH LEFT HEART CATHETERIZATION N/A 10/21/2024    Procedure: Angiogram, Coronary, with Left Heart Cath;  Surgeon: Pablo Buckner MD;  Location: [facility] CATH LAB;  Service: Cardiology;  Laterality: N/A;  [facility] VIA R[facility]    BREAST LUMPECTOMY      CARPAL TUNNEL RELEASE       SECTION       &     HYSTERECTOMY      JOINT REPLACEMENT  Knee replacements    KNEE SURGERY      TONSILLECTOMY      TUBAL LIGATION       Family History   Problem Relation Name Age of Onset    Diabetes Mother Mom     Cancer Mother Mom         Colon Cancer    Hypertension Mother Mom     Hyperlipidemia Mother Mom     Arthritis Mother Mom     Depression Mother Mom     Hyperlipidemia Father Dad     Hypertension  Father Dad     Diabetes Father Dad     CAMILO disease Father Dad     Prostate cancer Father Dad     Arthritis Father Dad     Cancer Father Dad         Prostate Cancer    Hearing loss Father Dad     Heart attack Sister      Cancer Maternal Grandmother Shi Moulton         Stomach Cancer    Diabetes Maternal Grandmother Shi Moulton     Arthritis Sister Doreen Cox         Knee replacement, shoulder replacement    Heart disease Sister Doreen Cox     Arthritis Sister Mansi Ibrahim     Diabetes Sister Mansi Ibrahim     Hypertension Sister Mansi Ibrahim     Kidney disease Sister Mansi Ibrahim         Stage 3    Arthritis Brother Brandon Ibrahim     Cancer Brother Brandon Ibrahim         Prostate Cancer    Diabetes Brother Brandon Patrice     Hypertension Brother Brandon Patrice     Arthritis Brother Delano Patrice     Cancer Brother Delano Patrice         Colon Cancer    Diabetes Brother Delano Patrice     Heart disease Brother Delano Patrice     Stroke Brother Delano Patrice     Cancer Maternal Uncle Shereen Moulton jr.         Lukemia    Diabetes Maternal Uncle Shereen Moulton jr.     Hypertension Maternal Uncle Shereen Moulton jr.     Cancer Paternal Aunt Nilam Diaz         Breast Cancer    Cancer Daughter Ludy VILLELA Ana'         Uterine cancer    Hypertension Sister Sherie Ibrahim     Learning disabilities Sister Sherie Ibrahim              Social History[1]  Medication List with Changes/Refills   Current Medications    ASPIRIN 81 MG CHEW    Take 81 mg by mouth once daily.    BLOOD SUGAR DIAGNOSTIC (FREESTYLE LITE STRIPS MISC)        DIPHENHYDRAMINE (BENADRYL) 25 MG CAPSULE    1 cap(s) orally 3 times a day    ESOMEPRAZOLE (NEXIUM) 40 MG CAPSULE    1 cap(s) orally once a day    EZETIMIBE (ZETIA) 10 MG TABLET    Take 10 mg by mouth.    FLUOROMETHOLONE 0.1% (FML) 0.1 % DRPS    INSTILL ONE DROP INTO AFFECTED EYE 4 TIMES A DAY FOR 2 WEEKS,THEN TWICE DAILY FOR 2 WEEKS    GABAPENTIN (NEURONTIN) 600 MG TABLET    Take 600 mg by mouth 3 (three) times  daily.    LANCETS 25 GAUGE MISC        LEVOFLOXACIN (LEVAQUIN) 500 MG TABLET    Take 1 tablet (500 mg total) by mouth once daily.    LEVOTHYROXINE (SYNTHROID, LEVOTHROID) 175 MCG TABLET    Take 1 tablet by mouth once daily.    LIOTHYRONINE (CYTOMEL) 25 MCG TAB    Take 1 tablet by mouth once daily.    LORATADINE (CLARITIN) 10 MG TABLET    1 tab(s) orally once a day    LOSARTAN (COZAAR) 50 MG TABLET    Take 50 mg by mouth once daily.    METFORMIN (GLUCOPHAGE) 500 MG TABLET    Take 500 mg by mouth 2 (two) times daily with meals.    METOPROLOL SUCCINATE (TOPROL-XL) 25 MG 24 HR TABLET    Take 25 mg by mouth once daily.    OMEGA-3 FATTY ACIDS/FISH OIL (FISH OIL-OMEGA-3 FATTY ACIDS) 300-1,000 MG CAPSULE    Take by mouth once daily.    PANTOPRAZOLE (PROTONIX) 40 MG TABLET    Take 40 mg by mouth 2 (two) times daily.    PAROXETINE (PAXIL) 30 MG TABLET    Take 60 mg by mouth once daily.    SIMVASTATIN (ZOCOR) 40 MG TABLET    Take 40 mg by mouth every evening.    VITAMIN D (VITAMIN D3) 1000 UNITS TAB    Take 2,000 Units by mouth once daily.     Review of patient's allergies indicates:   Allergen Reactions    Sulfamethoxazole-trimethoprim Rash       Vitals:    04/16/25 1012   BP: 126/79   Pulse: 68   Resp: 18   Temp: 98.7 °F (37.1 °C)         Body mass index is 58.56 kg/m².  GENERAL: Well appearing, appropriate for stated age, no acute distress.  CARDIOVASCULAR: Pulses regular by peripheral palpation.      Physical Exam:    Left hand   No thenar or hypothenar atrophy   Carpal tunnel incision healed appropriately without any complications   Tinel's negative at the carpal tunnel, Durkan's negative   No inducible triggering, however she is tender over the A1 pulley of the middle finger  Otherwise, full range of motion flexion and extension of all digits  AIN/PIN/ulnar nerve motor intact   Sensation intact to light touch median/ulnar/radial nerve   Radial pulse palpable, all digits warm well-perfused    Imaging:    X-rays of the  left hand demonstrate mild joint space narrowing of index, middle, ring and small finger MCP joints, no obvious osseous lesions or fractures    Assessment and Plan:    Bharati Townsend is a 64 y.o. female with left middle finger trigger finger which has failed conservative measures such as injections, activity modifications, she is interested in proceeding with A1 pulley release of the left middle finger.    -we will need cardiology clearance prior to surgery   -plan for 2025 with Dr. Muhammad  -orders placed today for case      Vahid Bowdenard, PGY 5   [employer] Orthopedic surgery             [1]   Social History  Socioeconomic History    Marital status:    Tobacco Use    Smoking status: Former     Current packs/day: 0.00     Average packs/day: 0.3 packs/day for 20.0 years (5.0 ttl pk-yrs)     Types: Cigarettes     Start date: 1979     Quit date: 1999     Years since quittin.7     Passive exposure: Past    Smokeless tobacco: Never    Tobacco comments:     Brest cancer   Substance and Sexual Activity    Alcohol use: Yes     Alcohol/week: 2.0 standard drinks of alcohol     Types: 2 Standard drinks or equivalent per week    Drug use: Never    Sexual activity: Yes     Partners: Male     Birth control/protection: None     Social Drivers of Health     Transportation Needs: Unknown (2018)    [facility] from [facility]    [facility]     Lack of Transportation (Medical): Patient declined     Lack of Transportation (Non-Medical): Patient declined

## 2025-04-16 NOTE — PROGRESS NOTES
Faculty Attestation: Bharati Townsend  was seen at [facility] in the Orthopaedic Clinic in the outpatient department at a [facility]. Patient seen and evaluated at the time of the visit.  I participated in the management of the patient and was immediately available throughout the encounter. History of Present Illness, Physical Exam, and Assessment and Plan reviewed. Treatment plan is reasonable and appropriate. Compliance with treatment recommendations is important. No procedure was performed.     Alan Muhammad MD  Orthopedic Surgery Chief

## 2025-04-30 RX ORDER — LOSARTAN POTASSIUM 25 MG/1
25 TABLET ORAL DAILY
COMMUNITY

## 2025-04-30 NOTE — OR NURSING
PACE CLIN[facility] NOTE     Date of procedure: 5/6/2025    Procedure: Left Trigger Finger Release      During pre admit call pt advised of the following:    Instructed to take all am meds EXCEPT Metformin am of procedure. She states her last dose of Asa was 4/22/2025 in preparation of surgery.     Nothing to eat or drink after midnight, ok to take am meds with sip of water.     Do not apply lotions, creams, deodorants after you bathe/shower prior to arrival    Be prepared to remove jewelry, piercings, contact lenses, dentures before going to the operating room.     You will not be allowed to drive home after your procedure. Please be sure to make arrangements for a .

## 2025-05-05 ENCOUNTER — PATIENT MESSAGE (OUTPATIENT)
Dept: SURGERY | Facility: HOSPITAL | Age: 65
End: 2025-05-05
Payer: MEDICARE

## 2025-05-05 ENCOUNTER — ANESTHESIA EVENT (OUTPATIENT)
Dept: SURGERY | Facility: HOSPITAL | Age: 65
End: 2025-05-05
Payer: MEDICARE

## 2025-05-05 PROBLEM — M65.332 TRIGGER MIDDLE FINGER OF LEFT HAND: Status: ACTIVE | Noted: 2025-05-05

## 2025-05-05 NOTE — ANESTHESIA PREPROCEDURE EVALUATION
05/05/2025  Bharati Townsend is a 64 y.o., female with PMHx of morbid obesity, HTN, HLD, ALTAGRACIA, GERD, DM, hypothyroidism, h/o breast CA, anxiety/depression presents for Lt trigger finger release.    Metoprolol--last dose     Active Ambulatory Problems     Diagnosis Date Noted    Kidney stones 07/08/2022    Dysuria 03/19/2025     Resolved Ambulatory Problems     Diagnosis Date Noted    No Resolved Ambulatory Problems     Past Medical History:   Diagnosis Date    Breast cancer     Depression     Diabetes mellitus     Diabetes mellitus, type 2     GERD (gastroesophageal reflux disease)     Headache     High cholesterol     Hypertension     Lumbar disc disease     Obesity     ALTAGRACIA on CPAP        Pre-op Assessment    I have reviewed the NPO Status.      Review of Systems  Anesthesia Hx:  No problems with previous Anesthesia                Social:  Non-Smoker       Hematology/Oncology:                        --  Cancer in past history:       Breast       chemotherapy, surgery and radiation       Cardiovascular:     Hypertension, well controlled           hyperlipidemia                               Pulmonary:        Sleep Apnea, CPAP                Renal/:  Renal/ Normal                 Hepatic/GI:     GERD, well controlled                Neurological:  Neurology Normal                                      Endocrine:  Diabetes, well controlled, type 2 Hypothyroidism        Morbid Obesity / BMI > 40  Psych:   anxiety depression              Vitals:    05/06/25 0906 05/06/25 0934 05/06/25 0957   BP:  110/61    Pulse:  75    Resp:   20   Temp:  36.6 °C (97.8 °F)    TempSrc:  Oral    SpO2:  97%    Weight: 134.7 kg (297 lb)           Physical Exam  General: Alert, Cooperative and Well nourished    Airway:  Mallampati: II   Mouth Opening: Normal  TM Distance: Normal  Tongue: Normal  Neck ROM: Normal  ROM    Dental:  Intact    Chest/Lungs:  Clear to auscultation, Normal Respiratory Rate    Heart:  Rate: Normal  Rhythm: Regular Rhythm  Sounds: Normal       Latest Reference Range & Units 05/06/25 09:27   POCT Glucose 70 - 110 mg/dL 124 (H)   (H): Data is abnormally high  Lab Results   Component Value Date    WBC 11.8 (H) 06/22/2022    HGB 13.4 06/22/2022    HCT 43.7 06/22/2022    MCV 92.2 06/22/2022     06/22/2022       CMP  Sodium   Date Value Ref Range Status   06/22/2022 142 136 - 145 mmol/L Final     Potassium   Date Value Ref Range Status   06/22/2022 5.0 3.5 - 5.1 mmol/L Final     Chloride   Date Value Ref Range Status   06/22/2022 103 98 - 107 mmol/L Final     CO2   Date Value Ref Range Status   06/22/2022 30 23 - 31 mmol/L Final     Glucose   Date Value Ref Range Status   06/22/2022 146 (H) 82 - 115 mg/dL Final     Blood Urea Nitrogen   Date Value Ref Range Status   06/22/2022 12.0 9.8 - 20.1 mg/dL Final     Creatinine   Date Value Ref Range Status   06/22/2022 0.78 0.55 - 1.02 mg/dL Final     Calcium   Date Value Ref Range Status   06/22/2022 9.7 8.4 - 10.2 mg/dL Final     Protein Total   Date Value Ref Range Status   06/22/2022 7.2 5.8 - 7.6 gm/dL Final     Albumin   Date Value Ref Range Status   06/22/2022 3.4 3.4 - 4.8 gm/dL Final     Bilirubin Total   Date Value Ref Range Status   06/22/2022 0.6 <=1.5 mg/dL Final     ALP   Date Value Ref Range Status   06/22/2022 77 40 - 150 unit/L Final     AST   Date Value Ref Range Status   06/22/2022 24 5 - 34 unit/L Final     ALT   Date Value Ref Range Status   06/22/2022 18 0 - 55 unit/L Final                 Anesthesia Plan  Type of Anesthesia, risks & benefits discussed:    Anesthesia Type: MAC  Intra-op Monitoring Plan: Standard ASA Monitors  Post Op Pain Control Plan: IV/PO Opioids PRN  Induction:  IV  Informed Consent: Informed consent signed with the Patient and all parties understand the risks and agree with anesthesia plan.  All questions answered.    ASA Score: 3  Day of Surgery Review of History & Physical: H&P Update referred to the surgeon/provider.    Ready For Surgery From Anesthesia Perspective.     .

## 2025-05-05 NOTE — DISCHARGE SUMMARY
[facility]  Discharge Note  Short Stay    Procedure(s) (LRB):  RELEASE, TRIGGER FINGER (Left)      OUTCOME: Patient tolerated treatment/procedure well without complication and is now ready for discharge.    DISPOSITION: Home or Self Care    FINAL DIAGNOSIS:  Left long finger trigger finger    FOLLOWUP: In clinic    DISCHARGE INSTRUCTIONS:    Discharge Procedure Orders   Lifting restrictions     Change dressing   Order Comments: See instructions        TIME SPENT ON DISCHARGE: 10 minutes    Justino Ludwig MD  [facility] Orthopedic Surgery PGY-3

## 2025-05-05 NOTE — H&P
Orthopedic H&P Note    Patient is 64F indicated for LEFT long finger open trigger release.     I have seen and examined the patient and there are no changes since her last clinic visit.    Patient is marked consented and all questions answered.    Justino Ludwig MD  [facility] Orthopedic Surgery PGY-3    __________________________________________    [facility]  Established Patient Office Visit  2025         Patient ID: Bharati Townsend  YOB: 1960  MRN: [id number]        Chief Complaint:     Bharati Townsend is a 64 y.o. female who who was referred from her primary care sports medicine colleagues for refractory left middle finger trigger finger.  She has tried and failed multiple rounds of injections, activity modification and bracing and continues to have painful triggering of the left middle finger.  She is interested in surgical intervention for this.  Denies recurrence of her carpal tunnel symptoms.  She also states she did see a rheumatologist in the past and was diagnosed with osteoarthritis in bilateral hands but was negative for rheumatologic conditions.           Occupation:  Retired  Sports/Hobbies:  Knitting and crafting  Hand dominance: right handed  Smoking:  No     Past Medical History:          Past Medical History:   Diagnosis Date    Breast cancer      Depression      Diabetes mellitus      Diabetes mellitus, type 2      GERD (gastroesophageal reflux disease)      Headache      High cholesterol      Hypertension      Lumbar disc disease      Obesity      ALTAGRACIA on CPAP              Past Surgical History:   Procedure Laterality Date    ANGIOGRAM, CORONARY, WITH LEFT HEART CATHETERIZATION N/A 10/21/2024     Procedure: Angiogram, Coronary, with Left Heart Cath;  Surgeon: Pablo Buckner MD;  Location: [facility] CATH LAB;  Service: Cardiology;  Laterality: N/A;  [facility] VIA [facility]    BREAST LUMPECTOMY        CARPAL TUNNEL RELEASE         SECTION          &      HYSTERECTOMY        JOINT REPLACEMENT   Knee replacements    KNEE SURGERY        TONSILLECTOMY        TUBAL LIGATION                 Family History   Problem Relation Name Age of Onset    Diabetes Mother Mom      Cancer Mother Mom           Colon Cancer    Hypertension Mother Mom      Hyperlipidemia Mother Mom      Arthritis Mother Mom      Depression Mother Mom      Hyperlipidemia Father Dad      Hypertension Father Dad      Diabetes Father Dad      CAMILO disease Father Dad      Prostate cancer Father Dad      Arthritis Father Dad      Cancer Father Dad           Prostate Cancer    Hearing loss Father Dad      Heart attack Sister        Cancer Maternal Grandmother Shi Moulton           Stomach Cancer    Diabetes Maternal Grandmother Shi Moulton      Arthritis Sister Doreen Cox           Knee replacement, shoulder replacement    Heart disease Sister Doreen Cox      Arthritis Sister Mansi Patrice      Diabetes Sister Mansi Patrice      Hypertension Sister Mansi Patrice      Kidney disease Sister Mansi Patrice           Stage 3    Arthritis Brother Brandon Patrice      Cancer Brother Brandon Patrice           Prostate Cancer    Diabetes Brother Brandon Patrice      Hypertension Brother Brandon Patrice      Arthritis Brother Delano Patrice      Cancer Brother Delano Patrice           Colon Cancer    Diabetes Brother Delano Patrice      Heart disease Brother Delano Patrice      Stroke Brother Delano Patrice      Cancer Maternal Uncle Shereen Moulton jr.           Lukemia    Diabetes Maternal Uncle Shereen Moulton jr.      Hypertension Maternal Uncle Shereen Moulton jr.      Cancer Paternal Aunt Nilam Diaz           Breast Cancer    Cancer Daughter Ludy Perez'           Uterine cancer    Hypertension Sister Sherie Ibrahim      Learning disabilities Sister Sherie Ibrahim                 [Social History]    [Social History]        Socioeconomic History    Marital status:    Tobacco Use    Smoking status: Former       Current  packs/day: 0.00       Average packs/day: 0.3 packs/day for 20.0 years (5.0 ttl pk-yrs)       Types: Cigarettes       Start date: 1979       Quit date: 1999       Years since quittin.7       Passive exposure: Past    Smokeless tobacco: Never    Tobacco comments:       [place]rest cancer   Substance and Sexual Activity    Alcohol use: Yes       Alcohol/week: 2.0 standard drinks of alcohol       Types: 2 Standard drinks or equivalent per week    Drug use: Never    Sexual activity: Yes       Partners: Male       Birth control/protection: None      Social Drivers of Health           Transportation Needs: Unknown (2018)     Received from [employer] [facility] Subsidiaries and Affiliates     [facility] Transportation      Lack of Transportation (Medical): Patient declined      Lack of Transportation (Non-Medical): Patient declined          Medication List with Changes/Refills   Current Medications     ASPIRIN 81 MG CHEW    Take 81 mg by mouth once daily.     BLOOD SUGAR DIAGNOSTIC (FREESTYLE LITE STRIPS MISC)         DIPHENHYDRAMINE (BENADRYL) 25 MG CAPSULE    1 cap(s) orally 3 times a day     ESOMEPRAZOLE (NEXIUM) 40 MG CAPSULE    1 cap(s) orally once a day     EZETIMIBE (ZETIA) 10 MG TABLET    Take 10 mg by mouth.     FLUOROMETHOLONE 0.1% (FML) 0.1 % DRPS    INSTILL ONE DROP INTO AFFECTED EYE 4 TIMES A DAY FOR 2 WEEKS,THEN TWICE DAILY FOR 2 WEEKS     GABAPENTIN (NEURONTIN) 600 MG TABLET    Take 600 mg by mouth 3 (three) times daily.     LANCETS 25 GAUGE MISC         LEVOFLOXACIN (LEVAQUIN) 500 MG TABLET    Take 1 tablet (500 mg total) by mouth once daily.     LEVOTHYROXINE (SYNTHROID, LEVOTHROID) 175 MCG TABLET    Take 1 tablet by mouth once daily.     LIOTHYRONINE (CYTOMEL) 25 MCG TAB    Take 1 tablet by mouth once daily.     LORATADINE (CLARITIN) 10 MG TABLET    1 tab(s) orally once a day     LOSARTAN (COZAAR) 50 MG TABLET    Take 50 mg by mouth once daily.     METFORMIN  (GLUCOPHAGE) 500 MG TABLET    Take 500 mg by mouth 2 (two) times daily with meals.     METOPROLOL SUCCINATE (TOPROL-XL) 25 MG 24 HR TABLET    Take 25 mg by mouth once daily.     OMEGA-3 FATTY ACIDS/FISH OIL (FISH OIL-OMEGA-3 FATTY ACIDS) 300-1,000 MG CAPSULE    Take by mouth once daily.     PANTOPRAZOLE (PROTONIX) 40 MG TABLET    Take 40 mg by mouth 2 (two) times daily.     PAROXETINE (PAXIL) 30 MG TABLET    Take 60 mg by mouth once daily.     SIMVASTATIN (ZOCOR) 40 MG TABLET    Take 40 mg by mouth every evening.     VITAMIN D (VITAMIN D3) 1000 UNITS TAB    Take 2,000 Units by mouth once daily.           Review of patient's allergies indicates:   Allergen Reactions    Sulfamethoxazole-trimethoprim Rash             Vitals:     04/16/25 1012   BP: 126/79   Pulse: 68   Resp: 18   Temp: 98.7 °F (37.1 °C)      Body mass index is 58.56 kg/m².  GENERAL: Well appearing, appropriate for stated age, no acute distress.  CARDIOVASCULAR: Pulses regular by peripheral palpation.     Physical Exam:     Left hand   No thenar or hypothenar atrophy   Carpal tunnel incision healed appropriately without any complications   Tinel's negative at the carpal tunnel, Durkan's negative   No inducible triggering, however she is tender over the A1 pulley of the middle finger  Otherwise, full range of motion flexion and extension of all digits  AIN/PIN/ulnar nerve motor intact   Sensation intact to light touch median/ulnar/radial nerve   Radial pulse palpable, all digits warm well-perfused     Imaging:     X-rays of the left hand demonstrate mild joint space narrowing of index, middle, ring and small finger MCP joints, no obvious osseous lesions or fractures     Assessment and Plan:     Bharati Townsend is a 64 y.o. female with left middle finger trigger finger which has failed conservative measures such as injections, activity modifications, she is interested in proceeding with A1 pulley release of the left middle finger.     -we will need  cardiology clearance prior to surgery   -plan for 05/06/2025 with Dr. Muhammad  -orders placed today for case        Vahid Jackson, PGY 5   [facility] Orthopedic surgery

## 2025-05-05 NOTE — DISCHARGE INSTRUCTIONS
No lifting with the left hand  Focus on moving the finger from a fist to full extension  May remove dressings in 3 days and then replace with clean gauze and wrap  May have running water over the wound after 7 days

## 2025-05-05 NOTE — OP NOTE
Orthopedic Surgery Operative Note     Date of Service: 05/06/25     Pre-Operative Diagnosis:  Left long finger trigger finger    Post-Operative Diagnosis: Same     Procedure(s):   1. Open long finger trigger finger release     Anesthesia: General     Surgeon:   Dr. Jb MD, was present and scrubbed for the key portions of the procedure.     Assistant(s):   Justino Ludwig MD    Indications   Patient is a 64 y.o.female with trigger finger of the left long finger. The patient was checked again in the Holding Room. The risks, benefits, complications, treatment options, and expected outcomes were reviewed again with the patient. The patient has elected to proceed with open trigger finger release. The risks and potential complications include but are not limited to infection, nerve injury, vascular injury, persistent pain, potential skin necrosis, deep vein thrombosis, possible pulmonary embolus, complications of the anesthetics and failure of the implants with potential need for future surgery to remove or revise. The patient concurred with the proposed plan, giving informed consent. The site of surgery was identified by the patient and properly noted/marked by me.     Implant:   1. None     Procedure Details:   The patient was taken to the operating room. A Time-Out was held and the patient, procedure and laterality were verified and agreed upon by all members of the operating room staff. Prophylactic antibiotics were given.The patient was given MAC/Local anesthesia. Non-sterile tourniquet applied to the upper arm. The upper extremity was sterilized with alcohol and chlorhexidine and then draped in standard fashion. Non-sterile tourniquet was inflated.    We began with a slightly oblique incision just distal to the distal palmar crease in line with the long digit.  We sharply incised the skin with a 15 blade and then bluntly dissected through subcutaneous tissues with Metzenbaum scissors.  Two Chanel retractors were  placed in the incision to retract fat out of the surgical field revealing the underlying A1 pulley.  The A1 pulley appeared to be thickened.  We released the A1 pulley in its midsubstance using a [place] blade exposing the underlying flexor tendons.  We then used tenotomy scissors to extend this pulley release both proximally and distally.  We examined the flexor tendons which appeared to have extensive tenosynovitis.  Once we looked both proximally distally ensuring that the pulley was completely released we then used a [place] and Metzenbaum scissors to bluntly debride the flexor tendons of the tenosynovitis and then we took the finger through a full range of motion where there was no noted catching of the digit like there was preoperatively.  We then washed out the wound with a copious normal saline irrigation and then buzz small superficial vessels using our bipolar electrocautery.  We then closed the skin with interrupted 4-0 Vicryl Rapide suture and dressed the wound with Xeroform followed by 4 x 4 gauze cast padding and a loosely wrapped Ace bandage.    Findings: A1 pulley overgrowth and inflammation     Estimated blood loss:  5cc     Drains: None     Total IV fluids: Per anesthesia records     Specimens: None     Complications: None, pt tolerated the procedure well     Disposition: Awakened from anesthesia, and taken to the recovery room in a stable condition, having suffered no apparent untoward event     Condition: doing well without problems     Post-Operative Management   - no lifting with the left-hand  - encouraged finger range of motion  - may take down dressings in 3 days  - running water over the wound and one-week  - follow up in clinic in 2 weeks    Justino Ludwig MD  [facility] Orthopedic Surgery PGY-3

## 2025-05-06 ENCOUNTER — HOSPITAL ENCOUNTER (OUTPATIENT)
Facility: HOSPITAL | Age: 65
Discharge: HOME | End: 2025-05-06
Attending: ORTHOPAEDIC SURGERY | Admitting: ORTHOPAEDIC SURGERY
Payer: MEDICARE

## 2025-05-06 ENCOUNTER — ANESTHESIA (OUTPATIENT)
Dept: SURGERY | Facility: HOSPITAL | Age: 65
End: 2025-05-06
Payer: MEDICARE

## 2025-05-06 DIAGNOSIS — M65.332 ACQUIRED TRIGGER FINGER OF LEFT MIDDLE FINGER: ICD-10-CM

## 2025-05-06 DIAGNOSIS — M65.332 TRIGGER MIDDLE FINGER OF LEFT HAND: Primary | ICD-10-CM

## 2025-05-06 LAB — POCT GLUCOSE: 124 MG/DL (ref 70–110)

## 2025-05-06 PROCEDURE — 37000008 HC ANESTHESIA 1ST 15 MINUTES: Performed by: ORTHOPAEDIC SURGERY

## 2025-05-06 PROCEDURE — 82962 GLUCOSE BLOOD TEST: CPT | Performed by: ORTHOPAEDIC SURGERY

## 2025-05-06 PROCEDURE — 71000016 HC POSTOP RECOV ADDL HR: Performed by: ORTHOPAEDIC SURGERY

## 2025-05-06 PROCEDURE — 26055 INCISE FINGER TENDON SHEATH: CPT | Mod: F2,,, | Performed by: ORTHOPAEDIC SURGERY

## 2025-05-06 PROCEDURE — 36000706: Performed by: ORTHOPAEDIC SURGERY

## 2025-05-06 PROCEDURE — 63600175 PHARM REV CODE 636 W HCPCS: Performed by: ORTHOPAEDIC SURGERY

## 2025-05-06 PROCEDURE — 25000003 PHARM REV CODE 250

## 2025-05-06 PROCEDURE — 36000707: Performed by: ORTHOPAEDIC SURGERY

## 2025-05-06 PROCEDURE — 71000015 HC POSTOP RECOV 1ST HR: Performed by: ORTHOPAEDIC SURGERY

## 2025-05-06 PROCEDURE — 63600175 PHARM REV CODE 636 W HCPCS: Performed by: ANESTHESIOLOGY

## 2025-05-06 PROCEDURE — 37000009 HC ANESTHESIA EA ADD 15 MINS: Performed by: ORTHOPAEDIC SURGERY

## 2025-05-06 PROCEDURE — 25000003 PHARM REV CODE 250: Performed by: ANESTHESIOLOGY

## 2025-05-06 PROCEDURE — 63600175 PHARM REV CODE 636 W HCPCS

## 2025-05-06 PROCEDURE — 63600175 PHARM REV CODE 636 W HCPCS: Performed by: STUDENT IN AN ORGANIZED HEALTH CARE EDUCATION/TRAINING PROGRAM

## 2025-05-06 RX ORDER — FENTANYL CITRATE 50 UG/ML
INJECTION, SOLUTION INTRAMUSCULAR; INTRAVENOUS
Status: DISCONTINUED | OUTPATIENT
Start: 2025-05-06 | End: 2025-05-06

## 2025-05-06 RX ORDER — HYDROCODONE BITARTRATE AND ACETAMINOPHEN 7.5; 325 MG/1; MG/1
1 TABLET ORAL EVERY 6 HOURS PRN
Qty: 14 TABLET | Refills: 0 | Status: SHIPPED | OUTPATIENT
Start: 2025-05-06

## 2025-05-06 RX ORDER — KETAMINE HCL IN 0.9 % NACL 50 MG/5 ML
SYRINGE (ML) INTRAVENOUS
Status: DISCONTINUED | OUTPATIENT
Start: 2025-05-06 | End: 2025-05-06

## 2025-05-06 RX ORDER — MIDAZOLAM HYDROCHLORIDE 2 MG/2ML
2 INJECTION, SOLUTION INTRAMUSCULAR; INTRAVENOUS ONCE AS NEEDED
Status: COMPLETED | OUTPATIENT
Start: 2025-05-06 | End: 2025-05-06

## 2025-05-06 RX ORDER — SODIUM CHLORIDE, SODIUM LACTATE, POTASSIUM CHLORIDE, CALCIUM CHLORIDE 600; 310; 30; 20 MG/100ML; MG/100ML; MG/100ML; MG/100ML
INJECTION, SOLUTION INTRAVENOUS CONTINUOUS
Status: DISCONTINUED | OUTPATIENT
Start: 2025-05-06 | End: 2025-05-06 | Stop reason: HOSPADM

## 2025-05-06 RX ORDER — METOPROLOL SUCCINATE 25 MG/1
25 TABLET, EXTENDED RELEASE ORAL ONCE
Status: COMPLETED | OUTPATIENT
Start: 2025-05-06 | End: 2025-05-06

## 2025-05-06 RX ORDER — LIDOCAINE HYDROCHLORIDE 10 MG/ML
INJECTION, SOLUTION EPIDURAL; INFILTRATION; INTRACAUDAL; PERINEURAL
Status: DISCONTINUED | OUTPATIENT
Start: 2025-05-06 | End: 2025-05-06 | Stop reason: HOSPADM

## 2025-05-06 RX ORDER — ONDANSETRON HYDROCHLORIDE 2 MG/ML
INJECTION, SOLUTION INTRAVENOUS
Status: DISCONTINUED | OUTPATIENT
Start: 2025-05-06 | End: 2025-05-06

## 2025-05-06 RX ORDER — LIDOCAINE HYDROCHLORIDE 20 MG/ML
INJECTION INTRAVENOUS
Status: DISCONTINUED | OUTPATIENT
Start: 2025-05-06 | End: 2025-05-06

## 2025-05-06 RX ORDER — SODIUM CHLORIDE, SODIUM LACTATE, POTASSIUM CHLORIDE, CALCIUM CHLORIDE 600; 310; 30; 20 MG/100ML; MG/100ML; MG/100ML; MG/100ML
INJECTION, SOLUTION INTRAVENOUS CONTINUOUS PRN
Status: DISCONTINUED | OUTPATIENT
Start: 2025-05-06 | End: 2025-05-06

## 2025-05-06 RX ORDER — CEFAZOLIN SODIUM 1 G/3ML
2 INJECTION, POWDER, FOR SOLUTION INTRAMUSCULAR; INTRAVENOUS
Status: COMPLETED | OUTPATIENT
Start: 2025-05-06 | End: 2025-05-06

## 2025-05-06 RX ORDER — BUPIVACAINE HYDROCHLORIDE 2.5 MG/ML
INJECTION, SOLUTION EPIDURAL; INFILTRATION; INTRACAUDAL; PERINEURAL
Status: DISCONTINUED | OUTPATIENT
Start: 2025-05-06 | End: 2025-05-06 | Stop reason: HOSPADM

## 2025-05-06 RX ORDER — PROPOFOL 10 MG/ML
INJECTION, EMULSION INTRAVENOUS
Status: DISCONTINUED | OUTPATIENT
Start: 2025-05-06 | End: 2025-05-06

## 2025-05-06 RX ORDER — NAPROXEN 500 MG/1
500 TABLET ORAL 2 TIMES DAILY
Qty: 24 TABLET | Refills: 0 | Status: SHIPPED | OUTPATIENT
Start: 2025-05-06

## 2025-05-06 RX ORDER — MUPIROCIN 20 MG/G
OINTMENT TOPICAL
Status: DISCONTINUED | OUTPATIENT
Start: 2025-05-06 | End: 2025-05-06 | Stop reason: HOSPADM

## 2025-05-06 RX ORDER — PROPOFOL 10 MG/ML
VIAL (ML) INTRAVENOUS CONTINUOUS PRN
Status: DISCONTINUED | OUTPATIENT
Start: 2025-05-06 | End: 2025-05-06

## 2025-05-06 RX ADMIN — CEFAZOLIN 3 G: 330 INJECTION, POWDER, FOR SOLUTION INTRAMUSCULAR; INTRAVENOUS at 01:05

## 2025-05-06 RX ADMIN — ONDANSETRON 4 MG: 2 INJECTION INTRAMUSCULAR; INTRAVENOUS at 01:05

## 2025-05-06 RX ADMIN — PROPOFOL 100 MCG/KG/MIN: 10 INJECTION, EMULSION INTRAVENOUS at 01:05

## 2025-05-06 RX ADMIN — SODIUM CHLORIDE, POTASSIUM CHLORIDE, SODIUM LACTATE AND CALCIUM CHLORIDE: 600; 310; 30; 20 INJECTION, SOLUTION INTRAVENOUS at 01:05

## 2025-05-06 RX ADMIN — FENTANYL CITRATE 25 MCG: 50 INJECTION INTRAMUSCULAR; INTRAVENOUS at 01:05

## 2025-05-06 RX ADMIN — PROPOFOL 40 MG: 10 INJECTION, EMULSION INTRAVENOUS at 01:05

## 2025-05-06 RX ADMIN — METOPROLOL SUCCINATE 25 MG: 25 TABLET, FILM COATED, EXTENDED RELEASE ORAL at 12:05

## 2025-05-06 RX ADMIN — LIDOCAINE HYDROCHLORIDE 100 MG: 20 INJECTION INTRAVENOUS at 01:05

## 2025-05-06 RX ADMIN — Medication 20 MG: at 01:05

## 2025-05-06 RX ADMIN — SODIUM CHLORIDE, POTASSIUM CHLORIDE, SODIUM LACTATE AND CALCIUM CHLORIDE: 600; 310; 30; 20 INJECTION, SOLUTION INTRAVENOUS at 11:05

## 2025-05-06 RX ADMIN — MIDAZOLAM HYDROCHLORIDE 2 MG: 1 INJECTION, SOLUTION INTRAMUSCULAR; INTRAVENOUS at 11:05

## 2025-05-06 NOTE — ANESTHESIA POSTPROCEDURE EVALUATION
Anesthesia Post Evaluation    Patient: Bharati T Faul    Procedure(s) Performed: Procedure(s) (LRB):  RELEASE, TRIGGER FINGER (Left)    Final Anesthesia Type: general      Patient location during evaluation: [facility]  Post-procedure vital signs: reviewed and stable  Airway patency: patent      Anesthetic complications: no      Cardiovascular status: hemodynamically stable  Respiratory status: spontaneous ventilation  Follow-up not needed.              Vitals Value Taken Time   /77 05/06/25 15:00   Temp 36.6 °C (97.9 °F) 05/06/25 15:00   Pulse 75 05/06/25 15:00   Resp 18 05/06/25 15:00   SpO2 96 % 05/06/25 15:00         No case tracking events are documented in the log.      Pain/Lauren Score: Lauren Score: 9 (5/6/2025  2:05 PM)  Modified Lauren Score: 19 (5/6/2025  2:50 PM)

## 2025-05-06 NOTE — TRANSFER OF CARE
Anesthesia Transfer of Care Note    Patient: Bharati Townsend    Procedure(s) Performed: Procedure(s) (LRB):  RELEASE, TRIGGER FINGER (Left)    Patient location: OPS    Anesthesia Type: MAC    Transport from OR: Transported from OR on room air with adequate spontaneous ventilation    Post pain: adequate analgesia    Post assessment: no apparent anesthetic complications    Post vital signs: stable    Level of consciousness: awake    Nausea/Vomiting: no nausea/vomiting    Complications: none    Transfer of care protocol was followed      Last vitals: Visit Vitals  BP (!) 155/88   Pulse 88   Temp 36.3 °C (97.3 °F) (Temporal)   Resp 20   Wt 134.7 kg (297 lb)   SpO2 100%   Breastfeeding No   BMI 58.00 kg/m²

## 2025-05-07 VITALS
RESPIRATION RATE: 18 BRPM | SYSTOLIC BLOOD PRESSURE: 135 MMHG | DIASTOLIC BLOOD PRESSURE: 77 MMHG | WEIGHT: 293 LBS | TEMPERATURE: 98 F | HEART RATE: 75 BPM | BODY MASS INDEX: 58 KG/M2 | OXYGEN SATURATION: 96 %

## 2025-05-07 LAB — POCT GLUCOSE: 86 MG/DL (ref 70–110)

## 2025-05-19 ENCOUNTER — OFFICE VISIT (OUTPATIENT)
Dept: ORTHOPEDICS | Facility: CLINIC | Age: 65
End: 2025-05-19
Payer: MEDICARE

## 2025-05-19 VITALS
WEIGHT: 293 LBS | DIASTOLIC BLOOD PRESSURE: 73 MMHG | HEIGHT: 60 IN | TEMPERATURE: 99 F | HEART RATE: 72 BPM | OXYGEN SATURATION: 96 % | SYSTOLIC BLOOD PRESSURE: 117 MMHG | RESPIRATION RATE: 16 BRPM | BODY MASS INDEX: 57.52 KG/M2

## 2025-05-19 DIAGNOSIS — Z09 POSTOP CHECK: Primary | ICD-10-CM

## 2025-05-19 PROCEDURE — 4010F ACE/ARB THERAPY RXD/TAKEN: CPT | Mod: CPTII,,, | Performed by: ORTHOPAEDIC SURGERY

## 2025-05-19 PROCEDURE — 1159F MED LIST DOCD IN RCRD: CPT | Mod: CPTII,,, | Performed by: ORTHOPAEDIC SURGERY

## 2025-05-19 PROCEDURE — 3078F DIAST BP <80 MM HG: CPT | Mod: CPTII,,, | Performed by: ORTHOPAEDIC SURGERY

## 2025-05-19 PROCEDURE — 3074F SYST BP LT 130 MM HG: CPT | Mod: CPTII,,, | Performed by: ORTHOPAEDIC SURGERY

## 2025-05-19 PROCEDURE — 99214 OFFICE O/P EST MOD 30 MIN: CPT | Mod: PBBFAC

## 2025-05-19 PROCEDURE — 99024 POSTOP FOLLOW-UP VISIT: CPT | Mod: ,,, | Performed by: ORTHOPAEDIC SURGERY

## 2025-05-19 NOTE — PROGRESS NOTES
[facility] Orthopedic Surgery Clinic Note    Orthopedic Issues:  Left middle finger trigger finger    Surgeries:  Left A1 pulley release    Bharati Townsend is a 64 y.o. female who who was referred from her primary care sports medicine colleagues for refractory left middle finger trigger finger.  She has tried and failed multiple rounds of injections, activity modification and bracing and continues to have painful triggering of the left middle finger.  She is interested in surgical intervention for this.  Denies recurrence of her carpal tunnel symptoms.  She also states she did see a rheumatologist in the past and was diagnosed with osteoarthritis in bilateral hands but was negative for rheumatologic conditions.        Occupation:  Retired  Sports/Hobbies:  Knitting and crafting  Hand dominance: right handed  Smoking:  No    Interval history:   Patient returns today for routine postoperative follow up of left middle finger trigger finger release.  She states that she did have a separation of her wound and her sutures came out few days after surgery with a small superficial blood blister although she denies interval wound issues, drainage, fevers or chills.  She is pleased with the range of motion of her finger, denies residual pain.        Vitals:    05/19/25 0915   BP: 117/73   Pulse: 72   Resp: 16   Temp: 98.5 °F (36.9 °C)           Physical Exam:    Left hand   Trigger finger incision healing appropriately wound edges well approximated, small resolving superficial blood blister, no evidence of infection  Full range of motion of the middle finger without locking or triggering  Middle finger numbness on radial aspect, normal sensation along ulnar aspect  AIN/PIN/ulnar nerve motor intact   Sensation intact to light touch median/ulnar/radial nerve   Radial pulse palpable, all digits warm well-perfused    Imaging:    No new imaging    Assessment and Plan:    Bharati Townsend is a 64 y.o. female with left middle finger trigger  finger which has failed conservative measures such as injections, activity modifications, she is interested in proceeding with A1 pulley release of the left middle finger.      -discussed local wound care, no soaking until at least 4 weeks out from surgery incomplete healing of her surgical wounds  -okay for finger range of motion as tolerated  -return to clinic in 1 month for re-evaluation or sooner as needed if she is doing well at this point we may discharge her          Vahid Jackson, PGY 5   [facility] Orthopedic surgery

## 2025-05-19 NOTE — PROGRESS NOTES
Faculty Attestation: Bharati Townsend  was seen at [facility] in the Orthopaedic Clinic. Discussed with the resident at the time of the visit. History of Present Illness, Physical Exam, and Assessment and Plan reviewed. Treatment plan is reasonable and appropriate. Compliance with treatment recommendations is important. No procedure was performed.     Curtis Daily MD  Orthopaedic Surgery

## 2025-06-15 ENCOUNTER — HOSPITAL ENCOUNTER (EMERGENCY)
Facility: HOSPITAL | Age: 65
Discharge: HOME | End: 2025-06-15
Attending: INTERNAL MEDICINE
Payer: MEDICARE

## 2025-06-15 VITALS
DIASTOLIC BLOOD PRESSURE: 78 MMHG | WEIGHT: 293 LBS | SYSTOLIC BLOOD PRESSURE: 148 MMHG | BODY MASS INDEX: 57.52 KG/M2 | TEMPERATURE: 98 F | RESPIRATION RATE: 18 BRPM | HEIGHT: 60 IN | OXYGEN SATURATION: 98 % | HEART RATE: 82 BPM

## 2025-06-15 DIAGNOSIS — N30.00 ACUTE CYSTITIS WITHOUT HEMATURIA: ICD-10-CM

## 2025-06-15 DIAGNOSIS — R10.9 LEFT FLANK PAIN: Primary | ICD-10-CM

## 2025-06-15 DIAGNOSIS — N20.0 NEPHROLITHIASIS: ICD-10-CM

## 2025-06-15 LAB
ANION GAP SERPL CALC-SCNC: 10 MEQ/L
BACTERIA #/AREA URNS AUTO: ABNORMAL /HPF
BILIRUB UR QL STRIP.AUTO: NEGATIVE
BUN SERPL-MCNC: 10.1 MG/DL (ref 9.8–20.1)
CALCIUM SERPL-MCNC: 9.3 MG/DL (ref 8.4–10.2)
CHLORIDE SERPL-SCNC: 106 MMOL/L (ref 98–107)
CLARITY UR: ABNORMAL
CO2 SERPL-SCNC: 27 MMOL/L (ref 23–31)
COLOR UR AUTO: YELLOW
CREAT SERPL-MCNC: 0.66 MG/DL (ref 0.55–1.02)
CREAT/UREA NIT SERPL: 15
GFR SERPLBLD CREATININE-BSD FMLA CKD-EPI: >60 ML/MIN/1.73/M2
GLUCOSE SERPL-MCNC: 125 MG/DL (ref 82–115)
GLUCOSE UR QL STRIP: NORMAL
HGB UR QL STRIP: NEGATIVE
HOLD SPECIMEN: NORMAL
HYALINE CASTS #/AREA URNS LPF: ABNORMAL /LPF
KETONES UR QL STRIP: NEGATIVE
LEUKOCYTE ESTERASE UR QL STRIP: 75
MUCOUS THREADS URNS QL MICRO: ABNORMAL /LPF
NITRITE UR QL STRIP: ABNORMAL
PH UR STRIP: 5.5 [PH]
POTASSIUM SERPL-SCNC: 4.1 MMOL/L (ref 3.5–5.1)
PROT UR QL STRIP: ABNORMAL
RBC #/AREA URNS AUTO: ABNORMAL /HPF
SODIUM SERPL-SCNC: 143 MMOL/L (ref 136–145)
SP GR UR STRIP.AUTO: 1.02 (ref 1–1.03)
SQUAMOUS #/AREA URNS LPF: ABNORMAL /HPF
UROBILINOGEN UR STRIP-ACNC: NORMAL
WBC #/AREA URNS AUTO: ABNORMAL /HPF

## 2025-06-15 PROCEDURE — 63600175 PHARM REV CODE 636 W HCPCS: Mod: JZ,TB

## 2025-06-15 PROCEDURE — 99285 EMERGENCY DEPT VISIT HI MDM: CPT | Mod: 25

## 2025-06-15 PROCEDURE — 63600175 PHARM REV CODE 636 W HCPCS: Performed by: INTERNAL MEDICINE

## 2025-06-15 PROCEDURE — 80048 BASIC METABOLIC PNL TOTAL CA: CPT | Performed by: INTERNAL MEDICINE

## 2025-06-15 PROCEDURE — 96374 THER/PROPH/DIAG INJ IV PUSH: CPT

## 2025-06-15 PROCEDURE — 25000003 PHARM REV CODE 250: Performed by: INTERNAL MEDICINE

## 2025-06-15 PROCEDURE — 81001 URINALYSIS AUTO W/SCOPE: CPT | Performed by: INTERNAL MEDICINE

## 2025-06-15 PROCEDURE — 96361 HYDRATE IV INFUSION ADD-ON: CPT

## 2025-06-15 PROCEDURE — 25000003 PHARM REV CODE 250

## 2025-06-15 PROCEDURE — 96375 TX/PRO/DX INJ NEW DRUG ADDON: CPT

## 2025-06-15 PROCEDURE — 87086 URINE CULTURE/COLONY COUNT: CPT | Performed by: INTERNAL MEDICINE

## 2025-06-15 RX ORDER — GABAPENTIN 300 MG/1
300 CAPSULE ORAL
Status: COMPLETED | OUTPATIENT
Start: 2025-06-15 | End: 2025-06-15

## 2025-06-15 RX ORDER — OXYCODONE AND ACETAMINOPHEN 5; 325 MG/1; MG/1
1 TABLET ORAL
Refills: 0 | Status: COMPLETED | OUTPATIENT
Start: 2025-06-15 | End: 2025-06-15

## 2025-06-15 RX ORDER — ONDANSETRON HYDROCHLORIDE 2 MG/ML
4 INJECTION, SOLUTION INTRAVENOUS ONCE AS NEEDED
Status: COMPLETED | OUTPATIENT
Start: 2025-06-15 | End: 2025-06-15

## 2025-06-15 RX ORDER — CIPROFLOXACIN 500 MG/1
500 TABLET, FILM COATED ORAL 2 TIMES DAILY
Qty: 28 TABLET | Refills: 0 | Status: SHIPPED | OUTPATIENT
Start: 2025-06-15 | End: 2025-06-15

## 2025-06-15 RX ORDER — TAMSULOSIN HYDROCHLORIDE 0.4 MG/1
0.4 CAPSULE ORAL
Status: COMPLETED | OUTPATIENT
Start: 2025-06-15 | End: 2025-06-15

## 2025-06-15 RX ORDER — CIPROFLOXACIN 500 MG/1
500 TABLET, FILM COATED ORAL 2 TIMES DAILY
Qty: 20 TABLET | Refills: 0 | Status: SHIPPED | OUTPATIENT
Start: 2025-06-15 | End: 2025-06-25

## 2025-06-15 RX ORDER — OXYCODONE AND ACETAMINOPHEN 5; 325 MG/1; MG/1
1 TABLET ORAL EVERY 12 HOURS PRN
Qty: 10 TABLET | Refills: 0 | Status: SHIPPED | OUTPATIENT
Start: 2025-06-15

## 2025-06-15 RX ORDER — MORPHINE SULFATE 2 MG/ML
4 INJECTION, SOLUTION INTRAMUSCULAR; INTRAVENOUS
Status: COMPLETED | OUTPATIENT
Start: 2025-06-15 | End: 2025-06-15

## 2025-06-15 RX ORDER — KETOROLAC TROMETHAMINE 30 MG/ML
15 INJECTION, SOLUTION INTRAMUSCULAR; INTRAVENOUS
Status: COMPLETED | OUTPATIENT
Start: 2025-06-15 | End: 2025-06-15

## 2025-06-15 RX ADMIN — GABAPENTIN 300 MG: 300 CAPSULE ORAL at 03:06

## 2025-06-15 RX ADMIN — MORPHINE SULFATE 4 MG: 2 INJECTION, SOLUTION INTRAMUSCULAR; INTRAVENOUS at 02:06

## 2025-06-15 RX ADMIN — KETOROLAC TROMETHAMINE 15 MG: 30 INJECTION, SOLUTION INTRAMUSCULAR; INTRAVENOUS at 02:06

## 2025-06-15 RX ADMIN — ONDANSETRON 4 MG: 2 INJECTION INTRAMUSCULAR; INTRAVENOUS at 03:06

## 2025-06-15 RX ADMIN — SODIUM CHLORIDE 500 ML: 9 INJECTION, SOLUTION INTRAVENOUS at 02:06

## 2025-06-15 RX ADMIN — TAMSULOSIN HYDROCHLORIDE 0.4 MG: 0.4 CAPSULE ORAL at 02:06

## 2025-06-15 RX ADMIN — OXYCODONE HYDROCHLORIDE AND ACETAMINOPHEN 1 TABLET: 5; 325 TABLET ORAL at 03:06

## 2025-06-15 NOTE — ED PROVIDER NOTES
Encounter Date: 6/15/2025       History     Chief Complaint   Patient presents with    Flank Pain     Pt states having left lower back pain since 7am and has gotten worse. States she thinks her kidney stones are moving     Bharati Townsend is a 64 y.o. female with a PMHx of kidney stones who presents to ED for left lower back pain. She reports that the pain started this morning and had progressively gotten worse since then. She describes the pain as a waxing and waning sharp pain located in her left lower back without any radiation. She has a history of lower back pain but it feels different compared to her usual back pain. She had tried Voltaren gel to the area which provided mild relief. She denies any fever, chills, abdominal pain, pelvic pain, pelvic fullness, dysuria and hematuria. She states that she hasn't urinated much today.    The history is provided by the patient. No  was used.     Review of patient's allergies indicates:   Allergen Reactions    Norco [hydrocodone-acetaminophen] Itching    Sulfamethoxazole-trimethoprim Rash     Past Medical History:   Diagnosis Date    Breast cancer     Depression     Diabetes mellitus     Diabetes mellitus, type 2     GERD (gastroesophageal reflux disease)     Headache     High cholesterol     Hypertension     Lumbar disc disease     Obesity     ALTAGRACIA on CPAP      Past Surgical History:   Procedure Laterality Date    ANGIOGRAM, CORONARY, WITH LEFT HEART CATHETERIZATION N/A 10/21/2024    Procedure: Angiogram, Coronary, with Left Heart Cath;  Surgeon: Pablo Buckner MD;  Location: [facility] CATH LAB;  Service: Cardiology;  Laterality: N/A;  [facility] VIA RRA    BREAST LUMPECTOMY      CARPAL TUNNEL RELEASE       SECTION       &     HYSTERECTOMY      JOINT REPLACEMENT  Knee replacements    KNEE SURGERY      TONSILLECTOMY      TRIGGER FINGER RELEASE Left 2025    Procedure: RELEASE, TRIGGER FINGER;  Surgeon: Alan Muhammad MD;  Location:  [facility];  Service: Orthopedics;  Laterality: Left;  mac/regional  supine/stretcher  hand table  carpal tunnel set    TUBAL LIGATION       Family History   Problem Relation Name Age of Onset    Diabetes Mother Mom     Cancer Mother Mom         Colon Cancer    Hypertension Mother Mom     Hyperlipidemia Mother Mom     Arthritis Mother Mom     Depression Mother Mom     Hyperlipidemia Father Dad     Hypertension Father Dad     Diabetes Father Dad     CAMILO disease Father Dad     Prostate cancer Father Dad     Arthritis Father Dad     Cancer Father Dad         Prostate Cancer    Hearing loss Father Dad     Heart attack Sister      Cancer Maternal Grandmother Shi Moulton         Stomach Cancer    Diabetes Maternal Grandmother Shi Moulton     Arthritis Sister Doreen Cox         Knee replacement, shoulder replacement    Heart disease Sister Doreen Cox     Arthritis Sister Mansi Ibrahim     Diabetes Sister Mansi Ibrahim     Hypertension Sister Mansi Ibrahim     Kidney disease Sister Mansi Ibrahim         Stage 3    Arthritis Brother Brandon Ibrahim     Cancer Brother Brandon Ibrahim         Prostate Cancer    Diabetes Brother Brandon Ibrahim     Hypertension Brother Brandon Ibrahim     Arthritis Brother Delano Patrice     Cancer Brother Delano Patrice         Colon Cancer    Diabetes Brother Delano Ibrahim     Heart disease Brother Delano Patrice     Stroke Brother Delano Patrice     Cancer Maternal Uncle Shereen Carltonluisa liang.         Lukemia    Diabetes Maternal Uncle Shereen Carltonluisa liang.     Hypertension Maternal Uncle Shereen Moulton .     Cancer Paternal Aunt Nilam Diaz         Breast Cancer    Cancer Daughter Ludy Perez'         Uterine cancer    Hypertension Sister Sherie Ibrahim     Learning disabilities Sister Sherie Ibrahim              Social History[1]  Review of Systems   Constitutional:  Negative for chills and fever.   Respiratory:  Negative for shortness of breath.    Cardiovascular:  Negative for chest pain.    Gastrointestinal:  Negative for abdominal pain, constipation, diarrhea, nausea and vomiting.   Genitourinary:  Positive for flank pain (left lower). Negative for difficulty urinating, dysuria, frequency, hematuria, pelvic pain and urgency.   Musculoskeletal:  Positive for back pain. Negative for myalgias.   Neurological:  Negative for light-headedness.       Physical Exam     Initial Vitals [06/15/25 0142]   BP Pulse Resp Temp SpO2   (!) 153/87 88 20 97.5 °F (36.4 °C) 98 %      MAP       --         Physical Exam    Nursing note and vitals reviewed.  Constitutional: She appears well-developed and well-nourished. She is not diaphoretic. She appears distressed.   HENT:   Head: Normocephalic and atraumatic.   Eyes: Conjunctivae are normal. No scleral icterus.   Neck: Neck supple.   Normal range of motion.  Cardiovascular:  Normal rate, regular rhythm and normal heart sounds.     Exam reveals no gallop and no friction rub.       No murmur heard.  Pulmonary/Chest: Breath sounds normal. No respiratory distress. She has no wheezes. She has no rhonchi. She has no rales. She exhibits no tenderness.   Abdominal: Abdomen is soft. Bowel sounds are normal. She exhibits no distension and no mass. There is no abdominal tenderness. There is no rebound and no guarding.   Musculoskeletal:         General: No tenderness or edema.      Cervical back: Normal range of motion and neck supple.      Comments: Tenderness to palpation to the left flank     Neurological: She is alert and oriented to person, place, and time.   Skin: Skin is warm and dry. Capillary refill takes less than 2 seconds. No erythema. No pallor.   Psychiatric: She has a normal mood and affect. Thought content normal.         ED Course   ED [facility] [place] [facility]    Date/Time: 6/15/2025 2:19 AM    Performed by: Thai Smith MD  Authorized by: Vaughn Figuerao MD    A focused ultrasound was performed for the following indication:  Left flank pain  Identified Structures:   Kidneys  Findings:  Enlargement of the renal calyces bilaterally, no evidence of hydronephrosis or notable stones  Impression:  Renal calcinosis  Charge?:  No (educational)    Labs Reviewed   URINALYSIS, REFLEX TO URINE CULTURE - Abnormal       Result Value    Color, UA Yellow      Appearance, UA Turbid (*)     Specific Gravity, UA 1.025      pH, UA 5.5      Protein, UA Trace (*)     Glucose, UA Normal      Ketones, UA Negative      Blood, UA Negative      Bilirubin, UA Negative      Urobilinogen, UA Normal      Nitrites, UA 2+ (*)     Leukocyte Esterase, UA 75 (*)     RBC, UA 0-5      WBC, UA 21-50 (*)     Bacteria, UA Moderate (*)     Squamous Epithelial Cells, UA Many (*)     Mucous, UA Occasional (*)     Hyaline Casts, UA None Seen     BASIC METABOLIC PANEL - Abnormal    Sodium 143      Potassium 4.1      Chloride 106      CO2 27      Glucose 125 (*)     Blood Urea Nitrogen 10.1      Creatinine 0.66      BUN/Creatinine Ratio 15      Calcium 9.3      Anion Gap 10.0      eGFR >60     CULTURE, URINE   EXTRA TUBES    Narrative:     The following orders were created for panel order EXTRA TUBES.  Procedure                               Abnormality         Status                     ---------                               -----------         ------                     Light Blue Top Hold[[id number]]                             Final result               Lavender Top Hold[[id number]]                               Final result               Gold Top Hold[[id number]]                                   Final result                 Please view results for these tests on the individual orders.   LIGHT BLUE TOP HOLD    Extra Tube Hold for add-ons.     LAVENDER TOP HOLD    Extra Tube Hold for add-ons.     GOLD TOP HOLD    Extra Tube Hold for add-ons.            Imaging Results              CT Abdomen Pelvis  Without Contrast (Preliminary result)  Result time 06/15/25 03:22:04      Preliminary result by Rickey Dawn MD  (06/15/25 03:22:04)                   Narrative:    START OF REPORT:  Technique: CT of the abdomen and pelvis was performed with axial images as well as sagittal and coronal reconstruction images without intravenous contrast.    Comparison: Comparison is with study [id number] 15:13:32.    Clinical History: Flank pain, kidney stone suspected.    Dosage Information: Automated Exposure Control was utilized 1295.27 mGy.cm.    Findings:  Lines and Tubes: None.  Thorax:  Lungs: The visualized lung bases appear unremarkable.  Pleura: No effusions or thickening.  Heart: Moderate coronary artery calcification is seen. This is stable from the prior study.  Abdomen:  Abdominal Wall: No abdominal wall pathology is seen.  Liver: Moderate stable-appearing fatty infiltration of the liver is present. The liver otherwise appears unremarkable.  Biliary System: No intrahepatic or extrahepatic biliary duct dilatation is seen.  Gallbladder: The gallbladder is non-distended and appears otherwise unremarkable.  Pancreas: The pancreas appears unremarkable.  Spleen: The spleen appears unremarkable.  Adrenals: The adrenal glands appear unremarkable.  Kidneys: The left kidney appears unremarkable with no stones cysts masses or hydronephrosis. A few stable non-obstructing stones are again seen in the middle calyx of the right collecting system with the largest measuring 3 mm (series 3 image 52). The right kidney is otherwise unremarkable with no cysts, masses or hydronephrosis.  Aorta: There is mild stable-appearing calcification of the abdominal aorta and its branches.  IVC: Unremarkable.  Bowel:  Esophagus: The visualized esophagus appears unremarkable.  Stomach: The stomach appears unremarkable.  Duodenum: Unremarkable appearing duodenum.  Small Bowel: The small bowel appears unremarkable.  Colon: Nondistended.  Appendix: The appendix appears unremarkable and is seen on series 2 images 70 to 102.  Peritoneum: No intraperitoneal free  air or ascites is seen.    Pelvis:  Bladder: The bladder appears unremarkable.  Female:  Uterus: The uterus is not identified.  Ovaries: No adnexal masses are seen.    Bony structures:  Dorsal Spine: There is mild spondylosis of the visualized dorsal spine.  Bony Pelvis: There is mild degenerative change of the bilateral hips.      Impression:  1. No acute intraabdominal or pelvic solid organ or bowel pathology identified. Details and other findings as discussed above.                                         Medications   ketorolac injection 15 mg (15 mg Intravenous Given 6/15/25 0220)   tamsulosin 24 hr capsule 0.4 mg (0.4 mg Oral Given 6/15/25 0247)   sodium chloride 0.9% bolus 500 mL 500 mL (0 mLs Intravenous Stopped 6/15/25 0347)   morphine injection 4 mg (4 mg Intravenous Given 6/15/25 0247)   ondansetron injection 4 mg (4 mg Intravenous Given 6/15/25 0302)   oxyCODONE-acetaminophen 5-325 mg per tablet 1 tablet (1 tablet Oral Given 6/15/25 0342)   gabapentin capsule 300 mg (300 mg Oral Given 6/15/25 0342)     Medical Decision Making  Amount and/or Complexity of Data Reviewed  Labs:  Decision-making details documented in ED Course.  Radiology: ordered. Decision-making details documented in ED Course.    Risk  Prescription drug management.      Additional MDM:   Differential Diagnosis:   Differentials include pyelonephritis, AAA, PNA, and pancreatitis.            Attending Attestation:   Physician Attestation Statement for Resident:  As the supervising MD   Physician Attestation Statement: I have personally seen and examined this patient.   I agree with the above history.  -:   As the supervising MD I agree with the above PE.     As the supervising MD I agree with the above treatment, course, plan, and disposition.   I was personally present during the entire procedure.  I have reviewed and agree with the residents interpretation of the following: lab data and CT scans.  I have reviewed the following: old records  at this facility and old CTs.                ED Course as of 06/15/25 0453   Sun David 15, 2025   0223 BP(!): 153/87  Mildly elevated [HH]   0223 Basic metabolic panel(!)  Mildly hyperglycemic but otherwise unremarkable. No evidence of renal dysfunction. []   0250 Reevaluated patient. Patient still reporting pain. Given morphine and Zofran. [HH]   0326 CT Abdomen Pelvis  Without Contrast  Stable appearing nephrolithiases in the right kidney, no stones visible in the left kidney. No perinephric fat stranding around bilateral kidneys. Stone possible within the left distal ureter. [HH]   0337 Reevaluated patient again. Patient reports morphine helped improve pain about 50% but pain is coming back. Ordered Percocet 5-325 mg (patient known to tolerate) and gabapentin 300 mg.  []   0343 Urinalysis, Reflex to Urine Culture(!)  Urine sample dirty but suggestive of UTI with pyuria, leukocyte esterase, and 2+ nitrites. Review of prior urine culture positive for E. Coli sensitive to ciprofloxacin, levofloxacin, and Bactrim (resistant to ampicillin). [HH]   0452 Reevaluated patient with significant improvement in pain, reporting that pain is barely there. Stable for discharge with short course of Perocet 5-325 mg BID and 10 day course of ciprofloxacin 500 mg BID sent to pharmacy. [HH]      ED Course User Index  [HH] Thai Smith MD          Will prescribe ciprofloxacin 500 mg for 10 days. Patient was previously prescribed levofloxacin for a urine culture positive for E. Coli on 3/2025 that was resistant to ampicillin.                 Clinical Impression:  Final diagnoses:  [N20.0] Nephrolithiasis  [R10.9] Left flank pain (Primary)  [N30.00] Acute cystitis without hematuria          ED Disposition Condition    Discharge Stable          ED Prescriptions       Medication Sig Dispense Start Date End Date Auth. Provider    ciprofloxacin HCl (CIPRO) 500 MG tablet  (Status: Discontinued) Take 1 tablet (500 mg total) by mouth 2 (two)  times daily. for 14 days 28 tablet 6/15/2025 6/15/2025 Thai Smith MD    ciprofloxacin HCl (CIPRO) 500 MG tablet Take 1 tablet (500 mg total) by mouth 2 (two) times daily. for 10 days 20 tablet 6/15/2025 2025 Thai Smith MD    oxyCODONE-acetaminophen (PERCOCET) 5-325 mg per tablet Take 1 tablet by mouth every 12 (twelve) hours as needed for Pain. 10 tablet 6/15/2025 -- Thai Smith MD          Follow-up Information       Follow up With Specialties Details Why Contact Info    [facility] Emergency Medicine Go in 2 days If symptoms worsen [address] [facility] [address] [phone]    Cony Bourgeois MD Internal Medicine Schedule an appointment as soon as possible for a visit in 1 week For post-ED discharge follow-up [address]  [facility] [address]  [phone]            Thai Smith MD  PGY-1 Resident  [facility]  06/15/2025         Thai Smith MD  Resident  06/15/25 0453         [1]   Social History  Tobacco Use    Smoking status: Former     Current packs/day: 0.00     Average packs/day: 0.3 packs/day for 20.0 years (5.0 ttl pk-yrs)     Types: Cigarettes     Start date: 1979     Quit date: 1999     Years since quittin.8     Passive exposure: Past    Smokeless tobacco: Never    Tobacco comments:     Brest cancer   Vaping Use    Vaping status: Never Used   Substance Use Topics    Alcohol use: Yes     Alcohol/week: 2.0 standard drinks of alcohol     Types: 2 Standard drinks or equivalent per week    Drug use: Never        Vaughn Figueroa MD  06/15/25 0624

## 2025-06-17 LAB — BACTERIA UR CULT: ABNORMAL

## 2025-06-18 ENCOUNTER — OFFICE VISIT (OUTPATIENT)
Dept: ORTHOPEDICS | Facility: CLINIC | Age: 65
End: 2025-06-18
Payer: MEDICARE

## 2025-06-18 VITALS
WEIGHT: 293 LBS | OXYGEN SATURATION: 97 % | HEIGHT: 60 IN | TEMPERATURE: 98 F | DIASTOLIC BLOOD PRESSURE: 61 MMHG | SYSTOLIC BLOOD PRESSURE: 111 MMHG | HEART RATE: 69 BPM | RESPIRATION RATE: 17 BRPM | BODY MASS INDEX: 57.52 KG/M2

## 2025-06-18 DIAGNOSIS — M65.332 TRIGGER MIDDLE FINGER OF LEFT HAND: Primary | ICD-10-CM

## 2025-06-18 PROCEDURE — 99215 OFFICE O/P EST HI 40 MIN: CPT | Mod: PBBFAC

## 2025-06-18 NOTE — PROGRESS NOTES
[facility] Orthopedic Surgery Clinic Note    Orthopedic Issues:  Left middle finger trigger finger    Surgeries:  Left A1 pulley release    Bharati Townsend is a 64 y.o. female who who was referred from her primary care sports medicine colleagues for refractory left middle finger trigger finger.  She has tried and failed multiple rounds of injections, activity modification and bracing and continues to have painful triggering of the left middle finger.  She is interested in surgical intervention for this.  Denies recurrence of her carpal tunnel symptoms.  She also states she did see a rheumatologist in the past and was diagnosed with osteoarthritis in bilateral hands but was negative for rheumatologic conditions.    06/18/2025.  Came in today in the digit is doing relatively well.  Still a little sensitivity at the incision.  Little bit of numbness on the radial side of the digit.  But that is improving.        Occupation:  Retired  Sports/Hobbies:  Knitting and crafting  Hand dominance: right handed  Smoking:  No    Interval history:   Patient returns today for routine postoperative follow up of left middle finger trigger finger release.  She states that she did have a separation of her wound and her sutures came out few days after surgery with a small superficial blood blister although she denies interval wound issues, drainage, fevers or chills.  She is pleased with the range of motion of her finger, denies residual pain.        Vitals:    06/18/25 0751   BP: 111/61   Pulse: 69   Resp: 17   Temp: 98 °F (36.7 °C)           Physical Exam:    Left hand   Trigger finger incision is healed.  Mild tenderness over the tendon and incision site.  Full range of motion.    Imaging:    No new imaging    Assessment and Plan:    Bharati Townsend is a 64 y.o. female with left middle finger trigger finger which has failed conservative measures .  Underwent trigger finger release 6 weeks ago.  Now is stable and doing relatively well.  Still  needs a little Wound softening and desensitization.  She will call as needed.      Alan Muhammad  Chief of Orthopedics [facility]

## 2025-06-18 NOTE — PATIENT INSTRUCTIONS
Home exercises.  Obtain some Mederma or cocoa butter and massage scar to desensitize it and soften it.  Continue to work range of motion aggressively.

## 2025-06-19 ENCOUNTER — HOSPITAL ENCOUNTER (OUTPATIENT)
Dept: RADIOLOGY | Facility: HOSPITAL | Age: 65
Discharge: HOME | End: 2025-06-19
Attending: NURSE PRACTITIONER
Payer: MEDICARE

## 2025-06-19 DIAGNOSIS — N20.0 KIDNEY STONES: ICD-10-CM

## 2025-06-19 PROCEDURE — 76775 US EXAM ABDO BACK WALL LIM: CPT | Mod: TC

## 2025-06-26 ENCOUNTER — OFFICE VISIT (OUTPATIENT)
Dept: UROLOGY | Facility: CLINIC | Age: 65
End: 2025-06-26
Payer: MEDICARE

## 2025-06-26 VITALS
BODY MASS INDEX: 57.52 KG/M2 | DIASTOLIC BLOOD PRESSURE: 77 MMHG | OXYGEN SATURATION: 99 % | TEMPERATURE: 98 F | RESPIRATION RATE: 18 BRPM | HEIGHT: 60 IN | SYSTOLIC BLOOD PRESSURE: 121 MMHG | HEART RATE: 77 BPM | WEIGHT: 293 LBS

## 2025-06-26 DIAGNOSIS — N20.0 KIDNEY STONES: Primary | ICD-10-CM

## 2025-06-26 PROCEDURE — 99213 OFFICE O/P EST LOW 20 MIN: CPT | Mod: S$PBB,,, | Performed by: NURSE PRACTITIONER

## 2025-06-26 PROCEDURE — 1160F RVW MEDS BY RX/DR IN RCRD: CPT | Mod: CPTII,,, | Performed by: NURSE PRACTITIONER

## 2025-06-26 PROCEDURE — 4010F ACE/ARB THERAPY RXD/TAKEN: CPT | Mod: CPTII,,, | Performed by: NURSE PRACTITIONER

## 2025-06-26 PROCEDURE — 3074F SYST BP LT 130 MM HG: CPT | Mod: CPTII,,, | Performed by: NURSE PRACTITIONER

## 2025-06-26 PROCEDURE — 3078F DIAST BP <80 MM HG: CPT | Mod: CPTII,,, | Performed by: NURSE PRACTITIONER

## 2025-06-26 PROCEDURE — 99215 OFFICE O/P EST HI 40 MIN: CPT | Mod: PBBFAC | Performed by: NURSE PRACTITIONER

## 2025-06-26 PROCEDURE — 3008F BODY MASS INDEX DOCD: CPT | Mod: CPTII,,, | Performed by: NURSE PRACTITIONER

## 2025-06-26 PROCEDURE — 1159F MED LIST DOCD IN RCRD: CPT | Mod: CPTII,,, | Performed by: NURSE PRACTITIONER

## 2025-06-26 NOTE — PROGRESS NOTES
Chief Complaint:   Chief Complaint   Patient presents with    Follow up after ED visit with ultrasound results       HPI:   Patient is 63-year-old female here for 6 month follow-up for nephrolithiasis.    Patient has been on surveillance for nephrolithiasis for the past 2 years.  .   Today patient presents with recent ER visit for left lower abdominal discomfort intermittent for the past 3 days on 06/15/2025.  Discussed with patient decrease salt intake increase water intake and also to follow normal calcium intake daily.  CT on 06/15/2025 as listed below results without any identification of stones present.        Allergies:  Review of patient's allergies indicates:   Allergen Reactions    Norco [hydrocodone-acetaminophen] Itching    Sulfamethoxazole-trimethoprim Rash       Medications:  Current Medications[1]    Review of Systems:  General: No fever, chills, fatigability, or weight loss.  Skin: No rashes, itching, or changes in color or texture of skin.  Chest: Denies HAYES, cyanosis, wheezing, cough, and sputum production.  Abdomen: Appetite fine. No weight loss. Denies diarrhea, abdominal pain, hematemesis, or blood in stool.  Musculoskeletal: No joint stiffness or swelling. Denies back pain.  : As above.  All other review of systems negative.    PMH:  Past Medical History:   Diagnosis Date    Breast cancer     Depression     Diabetes mellitus     Diabetes mellitus, type 2     GERD (gastroesophageal reflux disease)     Headache     High cholesterol     Hypertension     Lumbar disc disease     Obesity     ALTAGRACIA on CPAP        PSH:  Past Surgical History:   Procedure Laterality Date    ANGIOGRAM, CORONARY, WITH LEFT HEART CATHETERIZATION N/A 10/21/2024    Procedure: Angiogram, Coronary, with Left Heart Cath;  Surgeon: Pablo Buckner MD;  Location: [facility] CATH LAB;  Service: Cardiology;  Laterality: N/A;  [facility] VIA RRA    BREAST LUMPECTOMY      CARPAL TUNNEL RELEASE       SECTION       &      HYSTERECTOMY  1996    JOINT REPLACEMENT  Knee replacements    KNEE SURGERY      TONSILLECTOMY      TRIGGER FINGER RELEASE Left 05/06/2025    Procedure: RELEASE, TRIGGER FINGER;  Surgeon: Alan Muhammad MD;  Location: [facility];  Service: Orthopedics;  Laterality: Left;  mac/regional  supine/stretcher  hand table  carpal tunnel set    TUBAL LIGATION         FamHx:  Family History   Problem Relation Name Age of Onset    Diabetes Mother Mom     Cancer Mother Mom         Colon Cancer    Hypertension Mother Mom     Hyperlipidemia Mother Mom     Arthritis Mother Mom     Depression Mother Mom     Hyperlipidemia Father Dad     Hypertension Father Dad     Diabetes Father Dad     CAMILO disease Father Dad     Prostate cancer Father Dad     Arthritis Father Dad     Cancer Father Dad         Prostate Cancer    Hearing loss Father Dad     Heart attack Sister      Cancer Maternal Grandmother Shi Moulton         Stomach Cancer    Diabetes Maternal Grandmother Shi Moulton     Arthritis Sister Doreen Cox         Knee replacement, shoulder replacement    Heart disease Sister Doreen Cox     Arthritis Sister Mansi Patrice     Diabetes Sister Mansi Patrice     Hypertension Sister Mansi Patrice     Kidney disease Sister Mansi Patrice         Stage 3    Arthritis Brother Brandon Ibrahim     Cancer Brother Brandon Ibrahim         Prostate Cancer    Diabetes Brother Brandon Patrice     Hypertension Brother Brandon Patrice     Arthritis Brother Delano Patrice     Cancer Brother Delano Patrice         Colon Cancer    Diabetes Brother Delano Patrice     Heart disease Brother Delano Patrice     Stroke Brother Delano Patrice     Cancer Maternal Uncle Shereen Moulton jr.         Lukemia    Diabetes Maternal Uncle Shereen Moulton jr.     Hypertension Maternal Uncle Shereen Moulton jr.     Cancer Paternal Aunt Nilam Diaz         Breast Cancer    Cancer Daughter Ludy A Ana'         Uterine cancer    Hypertension Sister Sherie Ibrahim     Learning disabilities Sister  Sherie Ibrahim                SocHx:  Social History[2]    Physical Exam:  Vitals:    25 0809   BP: 121/77   Pulse: 77   Resp: 18   Temp: 97.9 °F (36.6 °C)     General: A&Ox3, no apparent distress, no deformities  Neck: No masses, normal thyroid  Lungs: CTA corrine, no use of accessory muscles  Heart: RRR, no arrhythmias  Abdomen: Soft, NT, ND, no masses, no hernias, no hepatosplenomegaly  Lymphatic: Neck and groin nodes negative  Skin: The skin is warm and dry. No jaundice.  Ext: No c/c/e.    Imaging:  CT abdomen pelvis without contrast stone protocol on 06/15/2025 revealed: No acute abnormality of the abdomen and pelvis.       Impression:  Kidney stone    Plan:  Instructed patient to RTC six-month with renal ultrasound.  Increase fluid intake, limit salt in diet, limit protein to 30%, intake adequate calcium, decrease brand, soy, Beets, Almonds, Rhubard, Buckwheat flour, baked potato, raspberry, potato chips Spinach, Miso, Tahini, sesame, Swiss Chard. Instructed patient on Avoiding bladder irritants, such as alcohol, citrus drinks or foods,salty foods, energy drinks, spicy foods, caffeine, sodas.  Instructed patient if develops any abnormal urologic symptoms notify clinic to be re-evaluate treated or during after hours go to emergency room versus urgent here.                           [facility]       [1]   Current Outpatient Medications   Medication Sig Dispense Refill    aspirin 81 MG Chew Take 81 mg by mouth once daily.      blood sugar diagnostic (FREESTYLE LITE STRIPS [facility])       ezetimibe (ZETIA) 10 mg tablet Take 10 mg by mouth.      gabapentin (NEURONTIN) 600 MG tablet Take 600 mg by mouth 3 (three) times daily.      lancets 25 gauge Misc       levothyroxine (SYNTHROID, LEVOTHROID) 175 MCG tablet Take 1 tablet by mouth once daily.      loratadine (CLARITIN) 10 mg tablet 1 tab(s) orally once a day      losartan (COZAAR) 25 MG tablet Take 25 mg by mouth once daily.      metFORMIN (GLUCOPHAGE) 500 MG tablet  Take 500 mg by mouth 2 (two) times daily with meals.      metoprolol succinate (TOPROL-XL) 25 MG 24 hr tablet Take 25 mg by mouth once daily.      oxyCODONE-acetaminophen (PERCOCET) 5-325 mg per tablet Take 1 tablet by mouth every 12 (twelve) hours as needed for Pain. 10 tablet 0    pantoprazole (PROTONIX) 40 MG tablet Take 40 mg by mouth 2 (two) times daily.      paroxetine (PAXIL) 30 MG tablet Take 60 mg by mouth once daily.      simvastatin (ZOCOR) 40 MG tablet Take 40 mg by mouth every evening.      fluorometholone 0.1% (FML) 0.1 % DrpS INSTILL ONE DROP INTO AFFECTED EYE 4 TIMES A DAY FOR 2 WEEKS,THEN TWICE DAILY FOR 2 WEEKS      naproxen (NAPROSYN) 500 MG tablet Take 1 tablet (500 mg total) by mouth 2 (two) times daily. 24 tablet 0     No current facility-administered medications for this visit.   [2]   Social History  Socioeconomic History    Marital status:    Tobacco Use    Smoking status: Former     Current packs/day: 0.00     Average packs/day: 0.3 packs/day for 20.0 years (5.0 ttl pk-yrs)     Types: Cigarettes     Start date: 1979     Quit date: 1999     Years since quittin.9     Passive exposure: Past    Smokeless tobacco: Never    Tobacco comments:     [place] cancer   Vaping Use    Vaping status: Never Used   Substance and Sexual Activity    Alcohol use: Yes     Alcohol/week: 2.0 standard drinks of alcohol     Types: 2 Standard drinks or equivalent per week    Drug use: Never    Sexual activity: Yes     Partners: Male     Birth control/protection: None     Social Drivers of Health     Transportation Needs: Unknown (2018)    Received from [facility] Subsidiaries and Affiliates    PRAPARE - Transportation     Lack of Transportation (Medical): Patient declined     Lack of Transportation (Non-Medical): Patient declined

## (undated) DEVICE — TUBING HP AIRLSS ROT ADPT 30IN

## (undated) DEVICE — SUT VICRYL RAPIDE 4-0 18 P

## (undated) DEVICE — BANDAGE ESMARK ELASTIC ST 4X9

## (undated) DEVICE — KIT SYR REUSABLE

## (undated) DEVICE — INTRODUCER TRNSRADIAL 6F 10CM

## (undated) DEVICE — BAND TR WITH INFLATOR

## (undated) DEVICE — POSITIONER HEAD SUPINE PINK

## (undated) DEVICE — KIT MANIFOLD LOW PRESS TUBING

## (undated) DEVICE — BANDAGE SWIFTWRAP ELAS 4INX5YD

## (undated) DEVICE — CATH RAD OPTITORQUE 5FR 110CM

## (undated) DEVICE — MANIFOLD 4 PORT

## (undated) DEVICE — TOURNIQUET SB QC DP 18X4IN

## (undated) DEVICE — GUIDEWIRE INQWIRE SE 3MM JTIP

## (undated) DEVICE — CANNULA ADULT NASAL 7FT

## (undated) DEVICE — CORD BIPOLAR 12 FOOT

## (undated) DEVICE — SOL NACL IRR 1000ML BTL

## (undated) DEVICE — PADDING WYTEX UNDRCST 4INX4YD

## (undated) DEVICE — ALCOHOL 70% ISO RUBBING 16OZ

## (undated) DEVICE — SYS WASTE FLD DISPOSAL 1400ML

## (undated) DEVICE — APPLICATOR CHLRAPRP 26ML

## (undated) DEVICE — BLADE SCALP OPHTL RND TIP

## (undated) DEVICE — KIT BASIC ORTHO UNIVERSITY

## (undated) DEVICE — KIT HAND CONTROL HIGH PRESSUR

## (undated) DEVICE — ELECTRODE PATIENT RETURN DISP

## (undated) DEVICE — Device

## (undated) DEVICE — PADDING CAST 4IN DELTA ROLL

## (undated) DEVICE — DRESSING GAUZE XEROFORM 5X9

## (undated) DEVICE — PAD DEFIB CADENCE ADULT R2

## (undated) DEVICE — DRAPE HAND STERILE